# Patient Record
Sex: FEMALE | Race: WHITE | Employment: FULL TIME | URBAN - METROPOLITAN AREA
[De-identification: names, ages, dates, MRNs, and addresses within clinical notes are randomized per-mention and may not be internally consistent; named-entity substitution may affect disease eponyms.]

---

## 2018-03-20 ENCOUNTER — TELEPHONE (OUTPATIENT)
Dept: GASTROENTEROLOGY | Age: 73
End: 2018-03-20

## 2018-03-20 NOTE — TELEPHONE ENCOUNTER
Pt called the office to check when her last c-scope with Dr. Donna Summers was. I told her it was 6/27/2011. Verbal understanding expressed.

## 2018-03-27 ENCOUNTER — TELEPHONE (OUTPATIENT)
Dept: GASTROENTEROLOGY | Age: 73
End: 2018-03-27

## 2018-03-27 NOTE — TELEPHONE ENCOUNTER
Jenifer Brown from [facility] called to see when patient is/was due for colonoscopy. I reviewed 's old records and patient's last c-scope was done in 2011 and patient was advised to repeat in 3 years but she did not. I faxed the report to [phone].

## 2018-04-04 ENCOUNTER — TELEPHONE (OUTPATIENT)
Dept: CARDIOLOGY CLINIC | Age: 73
End: 2018-04-04

## 2018-09-06 ENCOUNTER — OFFICE VISIT (OUTPATIENT)
Dept: CARDIOLOGY CLINIC | Age: 73
End: 2018-09-06

## 2018-09-06 VITALS
HEIGHT: 65 IN | SYSTOLIC BLOOD PRESSURE: 136 MMHG | DIASTOLIC BLOOD PRESSURE: 82 MMHG | HEART RATE: 76 BPM | OXYGEN SATURATION: 98 % | BODY MASS INDEX: 23.46 KG/M2 | WEIGHT: 140.8 LBS

## 2018-09-06 DIAGNOSIS — Z86.79 HISTORY OF ATRIAL FIBRILLATION: ICD-10-CM

## 2018-09-06 DIAGNOSIS — E78.2 MIXED HYPERLIPIDEMIA: ICD-10-CM

## 2018-09-06 DIAGNOSIS — I48.0 PAROXYSMAL ATRIAL FIBRILLATION (HCC): Primary | ICD-10-CM

## 2018-09-06 PROCEDURE — 99213 OFFICE O/P EST LOW 20 MIN: CPT | Performed by: NURSE PRACTITIONER

## 2018-09-06 NOTE — PROGRESS NOTES
CARDIOLOGY  NOTE      9/6/2018    RE: Lissett Lewis  ([birth year])                               TO:  Dr. Estefani Zapata MD  The primary cardiologist is Dr. Freya Escamilla    CC: paroxysmal atrial fib    Thank you for involving me in taking care of your  patient Lissett Lewis, who is a  68y.o. year old female with past medical  history of atrial fib and hyperlipidemia. She is seen today for a follow up on her atrial fib and hyperlipidemia. She during this visit she notes she was recently seen in [facility] for an irregular heart beat. She was found to be in atrial fib with an RVR and to have pneumonia. She currently denies any further palpitations or dizziness. She is able to tell when her rhythm is irregular and in atrial fib. There is no SOB or chest pain. She does drink 1 cup of coffee per day. There is no report of soda intake. Vitals:    09/06/18 1607   BP: 136/82   Pulse: 76   SpO2: 98%       Current Outpatient Prescriptions   Medication Sig Dispense Refill    azithromycin (ZITHROMAX) 250 MG tablet Take 1 tablet by mouth daily for 4 days 4 tablet 0    Calcium Carbonate-Vitamin D (CALCIUM 500/D PO) Take 500 mg by mouth 2 times daily      aspirin 325 MG tablet Take 325 mg by mouth daily      propafenone (RYTHMOL SR) 225 MG extended release capsule Take 1 capsule by mouth 2 times daily 180 capsule 3    zolpidem (AMBIEN) 5 MG tablet Take 5 mg by mouth nightly as needed for Sleep      atorvastatin (LIPITOR) 40 MG tablet Take 40 mg by mouth daily.  montelukast (SINGULAIR) 10 MG tablet Take 10 mg by mouth nightly. No current facility-administered medications for this visit. Allergies: Patient has no known allergies.   Past Medical History:   Diagnosis Date    Arrhythmia     AFIB    [facility]     Cancer ([facility]     padgets breast left    Constipation     H/O 24 hour EKG monitoring 12/27/2005 12/27/2005 supraventricular heartburn  · Genitourinary: No dysuria, trouble voiding, or hematuria  · Musculoskeletal:  None  · Integumentary: No rash or pruritis  · Neurological: No TIA or stroke symptoms  · Psychiatric: No anxiety or depression  · Endocrine: No malaise, fatigue or temperature intolerance  · Hematologic/Lymphatic: No bleeding problems, blood clots or swollen lymph nodes  · Allergic/Immunologic: No nasal congestion or hives    Objective:      Physical Exam:  /82 (Site: Left Arm, Position: Sitting)   Pulse 76   Ht 5' 5\" (1.651 m)   Wt 140 lb 12.8 oz (63.9 kg)   SpO2 98%   BMI 23.43 kg/m²   Wt Readings from Last 3 Encounters:   09/06/18 140 lb 12.8 oz (63.9 kg)   09/04/18 140 lb (63.5 kg)   07/25/18 140 lb 12.8 oz (63.9 kg)     Body mass index is 23.43 kg/m². GENERAL - Alert, oriented, pleasant, in no apparent distress. Head unremarkable  Eyes - Not injected conjunctiva  ENT  normal mucosa  Neck - Supple. No jugular venous distention noted. No carotid bruits. Cardiovascular  Normal S1 and S2 No murmur appreciated, No gallop. Extremities - No cyanosis, clubbing, no edema. Pulmonary  No respiratory distress. No wheezes or rales. Chest is clear in all lungs fields   Pulses: Bilateral radial and pedal pulses normal  Abdomen  no tenderness  Musculoskeletal  normal strength  Neurologic  There are no gross focal neurologic abnormalities.   Skin-  No rash  Affect- normal mood    DATA:  Lab Results   Component Value Date    CKTOTAL 60 09/04/2018     BNP:  No results found for: BNP  PT/INR:  No results found for: PTINR  No results found for: LABA1C  Lab Results   Component Value Date    CHOL 166 11/05/2015    TRIG 237 11/05/2015    HDL 42 11/05/2015    LDLCALC 77 11/05/2015     Lab Results   Component Value Date    ALT 21 09/04/2018    AST 30 09/04/2018     TSH:    Lab Results   Component Value Date    TSH 2.7 11/05/2015         Assessment/ Plan:     Patient seen,  interviewed and examined     Paroxysmal Atrial fib     Rate controlled yes. She is not on anticoagulation. taking full dose ASA   Taking Rythmol   Recent episode may be related to pneumonia - now on [address] vas 2   She want to think about further anticoagulation for now     Hyperlipidemia   no recent labs available    Patient is on statins  No change       Patient is encouraged to exercise even a brisk walk for 30 minutes at least 3 to 4 times a week. Lifestyle and risk factor modificatons discussed. Various goals are discussed and questions answered. Continue current medications. Appropriate prescriptions are addressed. Questions answered and patient verbalizes understanding.    Call for any problems, questions, or concerns.

## 2018-10-10 ENCOUNTER — HOSPITAL ENCOUNTER (OUTPATIENT)
Age: 73
Discharge: HOME | End: 2018-10-10
Payer: COMMERCIAL

## 2018-10-10 ENCOUNTER — HOSPITAL ENCOUNTER (OUTPATIENT)
Dept: GENERAL RADIOLOGY | Age: 73
Discharge: HOME | End: 2018-10-10
Payer: COMMERCIAL

## 2018-10-10 DIAGNOSIS — J18.9 UNRESOLVED PNEUMONIA: ICD-10-CM

## 2018-10-10 PROCEDURE — 71046 X-RAY EXAM CHEST 2 VIEWS: CPT

## 2018-10-25 ENCOUNTER — HOSPITAL ENCOUNTER (OUTPATIENT)
Dept: CT IMAGING | Age: 73
Discharge: HOME | End: 2018-10-25
Payer: COMMERCIAL

## 2018-10-25 DIAGNOSIS — R93.89 ABNORMAL CHEST X-RAY: ICD-10-CM

## 2018-10-25 LAB
GFR AFRICAN AMERICAN: >60 ML/MIN/1.73M2
GFR NON-AFRICAN AMERICAN: 54 ML/MIN/1.73M2
POC CREATININE: 1 MG/DL (ref 0.6–1.1)

## 2018-10-25 PROCEDURE — 71260 CT THORAX DX C+: CPT

## 2018-10-25 PROCEDURE — 6360000004 HC RX CONTRAST MEDICATION: Performed by: INTERNAL MEDICINE

## 2018-10-25 RX ADMIN — IOPAMIDOL 75 ML: 755 INJECTION, SOLUTION INTRAVENOUS at 09:19

## 2018-10-30 ENCOUNTER — HOSPITAL ENCOUNTER (OUTPATIENT)
Dept: CT IMAGING | Age: 73
Discharge: HOME | End: 2018-10-30
Payer: COMMERCIAL

## 2018-10-30 DIAGNOSIS — Z12.39 BREAST CANCER SCREENING: ICD-10-CM

## 2018-10-30 DIAGNOSIS — R93.2 NONVISUALIZATION OF GALLBLADDER: ICD-10-CM

## 2018-10-30 PROCEDURE — 74177 CT ABD & PELVIS W/CONTRAST: CPT

## 2018-10-30 PROCEDURE — 6360000004 HC RX CONTRAST MEDICATION: Performed by: INTERNAL MEDICINE

## 2018-10-30 RX ADMIN — IOPAMIDOL 75 ML: 755 INJECTION, SOLUTION INTRAVENOUS at 15:06

## 2018-11-06 ENCOUNTER — OFFICE VISIT (OUTPATIENT)
Dept: PULMONOLOGY | Age: 73
End: 2018-11-06
Payer: COMMERCIAL

## 2018-11-06 ENCOUNTER — HOSPITAL ENCOUNTER (OUTPATIENT)
Age: 73
Discharge: HOME | End: 2018-11-06
Payer: COMMERCIAL

## 2018-11-06 VITALS
DIASTOLIC BLOOD PRESSURE: 62 MMHG | HEART RATE: 67 BPM | SYSTOLIC BLOOD PRESSURE: 118 MMHG | HEIGHT: 65 IN | OXYGEN SATURATION: 98 % | BODY MASS INDEX: 24.59 KG/M2 | WEIGHT: 147.6 LBS

## 2018-11-06 DIAGNOSIS — K76.89 BENIGN LIVER CYST: ICD-10-CM

## 2018-11-06 DIAGNOSIS — R59.0 MEDIASTINAL LYMPHADENOPATHY: ICD-10-CM

## 2018-11-06 DIAGNOSIS — C50.012: ICD-10-CM

## 2018-11-06 DIAGNOSIS — D72.10 EOSINOPHILIA: ICD-10-CM

## 2018-11-06 DIAGNOSIS — R59.0 HILAR ADENOPATHY: ICD-10-CM

## 2018-11-06 LAB
APTT: 27.1 SECONDS (ref 21.2–33)
BASOPHILS ABSOLUTE: 0 K/CU MM
BASOPHILS RELATIVE PERCENT: 0.6 % (ref 0–1)
DIFFERENTIAL TYPE: ABNORMAL
EOSINOPHILS ABSOLUTE: 0.2 K/CU MM
EOSINOPHILS RELATIVE PERCENT: 3.2 % (ref 0–3)
HCT VFR BLD CALC: 44.2 % (ref 37–47)
HEMOGLOBIN: 14.2 GM/DL (ref 12.5–16)
IMMATURE NEUTROPHIL %: 0.4 % (ref 0–0.43)
INR BLD: 0.96 INDEX
LYMPHOCYTES ABSOLUTE: 1.1 K/CU MM
LYMPHOCYTES RELATIVE PERCENT: 22.4 % (ref 24–44)
MCH RBC QN AUTO: 29.8 PG (ref 27–31)
MCHC RBC AUTO-ENTMCNC: 32.1 % (ref 32–36)
MCV RBC AUTO: 92.9 FL (ref 78–100)
MONOCYTES ABSOLUTE: 0.5 K/CU MM
MONOCYTES RELATIVE PERCENT: 9.6 % (ref 0–4)
NUCLEATED RBC %: 0 %
PDW BLD-RTO: 12.8 % (ref 11.7–14.9)
PLATELET # BLD: 241 K/CU MM (ref 140–440)
PMV BLD AUTO: 10.1 FL (ref 7.5–11.1)
PROTHROMBIN TIME: 11 SECONDS (ref 9.12–12.5)
RBC # BLD: 4.76 M/CU MM (ref 4.2–5.4)
SEGMENTED NEUTROPHILS ABSOLUTE COUNT: 3.2 K/CU MM
SEGMENTED NEUTROPHILS RELATIVE PERCENT: 63.8 % (ref 36–66)
TOTAL IMMATURE NEUTOROPHIL: 0.02 K/CU MM
TOTAL NUCLEATED RBC: 0 K/CU MM
WBC # BLD: 5 K/CU MM (ref 4–10.5)

## 2018-11-06 PROCEDURE — 85730 THROMBOPLASTIN TIME PARTIAL: CPT

## 2018-11-06 PROCEDURE — 85610 PROTHROMBIN TIME: CPT

## 2018-11-06 PROCEDURE — 99204 OFFICE O/P NEW MOD 45 MIN: CPT | Performed by: INTERNAL MEDICINE

## 2018-11-06 PROCEDURE — 85025 COMPLETE CBC W/AUTO DIFF WBC: CPT

## 2018-11-06 PROCEDURE — 36415 COLL VENOUS BLD VENIPUNCTURE: CPT

## 2018-11-06 ASSESSMENT — ENCOUNTER SYMPTOMS
COUGH: 0
ABDOMINAL PAIN: 0
EYE ITCHING: 0
EYE DISCHARGE: 0
ABDOMINAL DISTENTION: 0
SHORTNESS OF BREATH: 0
BACK PAIN: 1
CHEST TIGHTNESS: 0

## 2018-11-06 ASSESSMENT — SLEEP AND FATIGUE QUESTIONNAIRES
HOW LIKELY ARE YOU TO NOD OFF OR FALL ASLEEP WHILE SITTING AND TALKING TO SOMEONE: 0
HOW LIKELY ARE YOU TO NOD OFF OR FALL ASLEEP WHILE SITTING QUIETLY AFTER LUNCH WITHOUT ALCOHOL: 0
HOW LIKELY ARE YOU TO NOD OFF OR FALL ASLEEP WHILE LYING DOWN TO REST IN THE AFTERNOON WHEN CIRCUMSTANCES PERMIT: 0
HOW LIKELY ARE YOU TO NOD OFF OR FALL ASLEEP IN A CAR, WHILE STOPPED FOR A FEW MINUTES IN TRAFFIC: 0
HOW LIKELY ARE YOU TO NOD OFF OR FALL ASLEEP WHILE SITTING INACTIVE IN A PUBLIC PLACE: 0
HOW LIKELY ARE YOU TO NOD OFF OR FALL ASLEEP WHEN YOU ARE A PASSENGER IN A CAR FOR AN HOUR WITHOUT A BREAK: 0
HOW LIKELY ARE YOU TO NOD OFF OR FALL ASLEEP WHILE SITTING AND READING: 0
ESS TOTAL SCORE: 1
HOW LIKELY ARE YOU TO NOD OFF OR FALL ASLEEP WHILE WATCHING TV: 1
NECK CIRCUMFERENCE (INCHES): 13

## 2018-11-06 NOTE — PROGRESS NOTES
Allergies    Past Medical History:   Diagnosis Date    Arrhythmia     AFIB    At[facility] fibrillation ([facility]     Cancer ([facility]     padgets breast left    Constipation     H/O 24 hour EKG monitoring 2005 supraventricular ectopic activity consisted of 1225 beats, five were in one run, 8 were atrial couplets, 200 were late beats, 992 were single PACS, 20 were in trigeminy, there was one dropped beat, fastest supraventricular run consisting of five beats , averaging 148 BPM    H/O echocardiogram 2015 2009    12/15 EF 60% mild MR, TR 2009 LVS is normal, EF > 55%, no pericardial effusion    H/O exercise stress test 13    History of atrial fibrillation     History of cardiovascular stress test 2009, 10/10/2007    2009 protocol Vitaliy, EF 70% global LVS function is normal, no evidence of inducible myocardial ischemia, normal pattern of perfusion in all  regions    History of Holter monitoring 12/16/15    predominant rhythm sinus    Hx of cardiovascular stress test 12/10/15    Vitaliy, EF 70%, No ischemia, normal study.     Hyperlipidemia     Postoperative hematoma of breast 7/3/2016    Tachycardia     Wears glasses        Past Surgical History:   Procedure Laterality Date    BREAST SURGERY      left nipple biopsy     SECTION      HYSTERECTOMY, VAGINAL      JOINT REPLACEMENT      KNEE SURGERY      right knee    OTHER SURGICAL HISTORY Left 2016    Breast exploration and evacuation of hematoma    PRE-MALIGNANT / BENIGN SKIN LESION EXCISION      breast 2016    SHOULDER SURGERY         Social History     Social History    Marital status:      Spouse name: N/A    Number of children: 4    Years of education: N/A     Occupational History          Social History Main Topics    Smoking status: Never Smoker    Smokeless tobacco: Never Used    Alcohol use Yes      Comment: Occasionally    Drug use: No    Sexual activity: Yes

## 2018-11-06 NOTE — ASSESSMENT & PLAN NOTE
She was treated with surgery and XRT about 2 years ago  She has mediastinal and hilar LN  I'll do a PET CT first

## 2018-11-15 ENCOUNTER — HOSPITAL ENCOUNTER (OUTPATIENT)
Dept: PET IMAGING | Age: 73
Discharge: HOME | End: 2018-11-15
Payer: COMMERCIAL

## 2018-11-15 DIAGNOSIS — R59.0 HILAR ADENOPATHY: ICD-10-CM

## 2018-11-15 DIAGNOSIS — R59.0 HILAR LYMPHADENOPATHY: ICD-10-CM

## 2018-11-15 DIAGNOSIS — R59.0 MEDIASTINAL LYMPHADENOPATHY: ICD-10-CM

## 2018-11-15 PROCEDURE — 78815 PET IMAGE W/CT SKULL-THIGH: CPT

## 2018-11-15 PROCEDURE — 3430000000 HC RX DIAGNOSTIC RADIOPHARMACEUTICAL: Performed by: INTERNAL MEDICINE

## 2018-11-15 PROCEDURE — A9552 F18 FDG: HCPCS | Performed by: INTERNAL MEDICINE

## 2018-11-15 RX ORDER — FLUDEOXYGLUCOSE F 18 200 MCI/ML
12.5 INJECTION, SOLUTION INTRAVENOUS
Status: COMPLETED | OUTPATIENT
Start: 2018-11-15 | End: 2018-11-15

## 2018-11-15 RX ADMIN — FLUDEOXYGLUCOSE F 18 12.5 MILLICURIE: 200 INJECTION, SOLUTION INTRAVENOUS at 09:18

## 2019-02-25 ENCOUNTER — OFFICE VISIT (OUTPATIENT)
Dept: CARDIOLOGY CLINIC | Age: 74
End: 2019-02-25
Payer: COMMERCIAL

## 2019-02-25 VITALS
WEIGHT: 153.4 LBS | HEART RATE: 63 BPM | HEIGHT: 65 IN | SYSTOLIC BLOOD PRESSURE: 138 MMHG | BODY MASS INDEX: 25.56 KG/M2 | DIASTOLIC BLOOD PRESSURE: 74 MMHG

## 2019-02-25 DIAGNOSIS — R00.2 PALPITATIONS: Primary | ICD-10-CM

## 2019-02-25 DIAGNOSIS — I48.91 ATRIAL FIBRILLATION WITH RAPID VENTRICULAR RESPONSE (HCC): ICD-10-CM

## 2019-02-25 PROCEDURE — 93000 ELECTROCARDIOGRAM COMPLETE: CPT | Performed by: INTERNAL MEDICINE

## 2019-02-25 PROCEDURE — 99213 OFFICE O/P EST LOW 20 MIN: CPT | Performed by: INTERNAL MEDICINE

## 2019-02-25 RX ORDER — MONTELUKAST SODIUM 10 MG/1
10 TABLET ORAL NIGHTLY
COMMUNITY

## 2019-02-27 ENCOUNTER — PROCEDURE VISIT (OUTPATIENT)
Dept: CARDIOLOGY CLINIC | Age: 74
End: 2019-02-27
Payer: COMMERCIAL

## 2019-02-27 VITALS
WEIGHT: 153 LBS | BODY MASS INDEX: 25.49 KG/M2 | SYSTOLIC BLOOD PRESSURE: 116 MMHG | HEIGHT: 65 IN | HEART RATE: 63 BPM | DIASTOLIC BLOOD PRESSURE: 68 MMHG

## 2019-02-27 DIAGNOSIS — I48.91 ATRIAL FIBRILLATION WITH RAPID VENTRICULAR RESPONSE (HCC): ICD-10-CM

## 2019-02-27 DIAGNOSIS — R00.2 PALPITATIONS: ICD-10-CM

## 2019-02-27 DIAGNOSIS — I49.9 IRREGULAR HEART RATE: ICD-10-CM

## 2019-02-27 DIAGNOSIS — Z86.79 HISTORY OF ATRIAL FIBRILLATION: Primary | ICD-10-CM

## 2019-02-27 PROCEDURE — 93015 CV STRESS TEST SUPVJ I&R: CPT | Performed by: INTERNAL MEDICINE

## 2019-03-25 ENCOUNTER — TELEPHONE (OUTPATIENT)
Dept: CARDIOLOGY CLINIC | Age: 74
End: 2019-03-25

## 2019-04-10 ENCOUNTER — NURSE ONLY (OUTPATIENT)
Dept: CARDIOLOGY CLINIC | Age: 74
End: 2019-04-10

## 2019-04-10 DIAGNOSIS — I48.91 ATRIAL FIBRILLATION, UNSPECIFIED TYPE (HCC): Primary | ICD-10-CM

## 2019-04-10 NOTE — PROGRESS NOTES
30 days e-cardio monitor placed.  # [id number]. Instructed patient on how to record the event and to call monitoring center at [phone] if any problems arise. Instructed patient to disconnect the lead wires from the electrodes before bathing or showering and reattach them afterwards. Instructed patient that the electrodes should be changed every 3 days or if they no longer adhere to the skin. Patient to mail package after the monitor has ended. Patient verbalized understanding.

## 2019-04-16 ENCOUNTER — TELEPHONE (OUTPATIENT)
Dept: CARDIOLOGY CLINIC | Age: 74
End: 2019-04-16

## 2019-04-17 DIAGNOSIS — I48.91 ATRIAL FIBRILLATION, UNSPECIFIED TYPE (HCC): Primary | ICD-10-CM

## 2019-05-03 ENCOUNTER — INITIAL CONSULT (OUTPATIENT)
Dept: CARDIOLOGY CLINIC | Age: 74
End: 2019-05-03
Payer: COMMERCIAL

## 2019-05-03 VITALS
HEART RATE: 73 BPM | OXYGEN SATURATION: 96 % | HEIGHT: 65 IN | DIASTOLIC BLOOD PRESSURE: 70 MMHG | RESPIRATION RATE: 20 BRPM | TEMPERATURE: 97.4 F | BODY MASS INDEX: 25.16 KG/M2 | SYSTOLIC BLOOD PRESSURE: 138 MMHG | WEIGHT: 151 LBS

## 2019-05-03 DIAGNOSIS — I49.9 IRREGULAR HEART RHYTHM: ICD-10-CM

## 2019-05-03 DIAGNOSIS — I49.5 TACHYCARDIA-BRADYCARDIA (HCC): ICD-10-CM

## 2019-05-03 DIAGNOSIS — I49.5 SICK SINUS SYNDROME (HCC): Primary | ICD-10-CM

## 2019-05-03 PROCEDURE — 99204 OFFICE O/P NEW MOD 45 MIN: CPT | Performed by: INTERNAL MEDICINE

## 2019-05-03 PROCEDURE — 93000 ELECTROCARDIOGRAM COMPLETE: CPT | Performed by: INTERNAL MEDICINE

## 2019-05-03 NOTE — PROGRESS NOTES
Electrophysiology Consult Note      Reason for consultation:  Palpitations , need for pacemaker    Chief complaint : Palpitations and dizziness    Referring physician: Moose Sanchez      Primary care physician: Samanta Aj MD      History of Present Illness:     Chief Complaint   Patient presents with         Patient of Dr. Aren Garza, here today for follow up for c/o palpitations and for results on a three week event monitor she wore, results not yet available as patient just removed 5/1/2019, serious event noted on day 4 results in media.  Results     3 weeek event monitor results. patient with Hx Atrial fib since 2000. She reports dizziness off and on with out warning but did not pass out.  Palpitations     patient states palpitations much better after stopping Zrytec. - still has palpitations every day for short periods. Denies SOB, dizziness, and edema.      Patient with PAF episodes for some time  Patient had been on rhythmol and cardizem before - HR was dropping and BP was low  So she is only on rhythmol    During palpitations, she feels short of breath and tired but other wise she feels okay barring few dizzy spells off and on       Pastmedical history:   Past Medical History:   Diagnosis Date    Arrhythmia     AFIB    Atrial fibrillation ([address]     Cancer ([facility] [address]     padgets breast left    Constipation     H/O 24 hour EKG monitoring 12/27/2005 12/27/2005 supraventricular ectopic activity consisted of 1225 beats, five were in one run, 8 were atrial couplets, 200 were late beats, 992 were single PACS, 20 were in trigeminy, there was one dropped beat, fastest supraventricular run consisting of five beats , averaging 148 BPM    H/O echocardiogram 12/16/2015 08/13/2009    12/15 EF 60% mild MR, TR 08/13/2009 LVS is normal, EF > 55%, no pericardial effusion    H/O exercise stress test 5/7/13    History of atrial fibrillation     History of cardiovascular stress test 2009, 10/10/2007    2009 protocol Vitaliy, EF 70% global LVS function is normal, no evidence of inducible myocardial ischemia, normal pattern of perfusion in all  regions    History of exercise stress test 2019    treadmill, normal    History of Holter monitoring 12/16/15    predominant rhythm sinus    Hx of cardiovascular stress test 12/10/15    Vitaliy, EF 70%, No ischemia, normal study.  Hyperlipidemia     Palpitations     Postoperative hematoma of breast 7/3/2016    Tachycardia     Wears glasses        Surgical history :   Past Surgical History:   Procedure Laterality Date    BREAST SURGERY      left nipple biopsy     SECTION      HYSTERECTOMY, VAGINAL      JOINT REPLACEMENT      KNEE SURGERY      right knee    OTHER SURGICAL HISTORY Left 2016    Breast exploration and evacuation of hematoma    PRE-MALIGNANT / BENIGN SKIN LESION EXCISION      breast 2016    SHOULDER SURGERY         Family history:   Family History   Problem Relation Age of Onset    Heart Attack Father     Heart Surgery Father         s/p CABG       Social history :  reports that she has never smoked. She has never used smokeless tobacco. She reports that she drinks alcohol. She reports that she does not use drugs.     No Known Allergies    Current Outpatient Medications on File Prior to Visit   Medication Sig Dispense Refill    montelukast (SINGULAIR) 10 MG tablet Take 10 mg by mouth nightly      DILT- MG extended release capsule TAKE ONE CAPSULE BY MOUTH DAILY FOR atrial fibrillation AND high blood pressure  1    Calcium Carbonate-Vitamin D (CALCIUM 500/D PO) Take 500 mg by mouth 2 times daily      aspirin 325 MG tablet Take 325 mg by mouth daily      propafenone (RYTHMOL SR) 225 MG extended release capsule Take 1 capsule by mouth 2 times daily 180 capsule 3    zolpidem (AMBIEN) 5 MG tablet Take 5 mg by mouth nightly as needed for Sleep      atorvastatin (LIPITOR) 40 MG tablet Take 40 mg by mouth daily. No current facility-administered medications on file prior to visit. Review of Systems:   Review of Systems   Constitutional: Positive for fatigue. Negative for activity change, chills and fever. HENT: Negative for congestion, ear pain and tinnitus. Eyes: Negative for photophobia, pain and visual disturbance. Respiratory: Positive for shortness of breath. Negative for cough, chest tightness and wheezing. Cardiovascular: Positive for palpitations. Negative for chest pain and leg swelling. Gastrointestinal: Negative for abdominal pain, blood in stool, constipation, diarrhea, nausea and vomiting. Endocrine: Negative for cold intolerance and heat intolerance. Genitourinary: Negative for dysuria, flank pain and hematuria. Musculoskeletal: Negative for arthralgias, back pain, myalgias and neck stiffness. Skin: Negative for color change and rash. Allergic/Immunologic: Negative for food allergies. Neurological: Positive for dizziness. Negative for numbness and headaches. Hematological: Does not bruise/bleed easily. Psychiatric/Behavioral: Negative for agitation, behavioral problems and confusion. Physical Examination:    /70 (Site: Left Upper Arm)   Pulse 73   Temp 97.4 °F (36.3 °C) (Temporal)   Resp 20   Ht 5' 5\" (1.651 m)   Wt 151 lb (68.5 kg)   SpO2 96%   BMI 25.13 kg/m²    Wt Readings from Last 3 Encounters:   05/03/19 151 lb (68.5 kg)   02/27/19 153 lb (69.4 kg)   02/25/19 153 lb 6.4 oz (69.6 kg)     Body mass index is 25.13 kg/m². Physical Exam   Constitutional: She is oriented to person, place, and time. She appears well-developed and well-nourished. HENT:   Head: Normocephalic and atraumatic. Eyes: Pupils are equal, round, and reactive to light. Conjunctivae and EOM are normal. Right eye exhibits no discharge. Neck: Normal range of motion. No JVD present. No thyromegaly present.    Cardiovascular: Normal rate, regular rhythm and normal heart sounds. Exam reveals no friction rub. No murmur heard. Pulmonary/Chest: Effort normal and breath sounds normal. No stridor. No respiratory distress. She has no wheezes. Abdominal: Soft. Bowel sounds are normal. She exhibits no distension. There is no tenderness. Musculoskeletal: Normal range of motion. She exhibits no edema or tenderness. Neurological: She is alert and oriented to person, place, and time. She displays normal reflexes. No cranial nerve deficit. Coordination normal.   Skin: Skin is warm and dry. No rash noted. No erythema. Psychiatric: She has a normal mood and affect.  Her behavior is normal.         CBC:   Lab Results   Component Value Date    WBC 5.0 11/06/2018    HGB 14.2 11/06/2018    HCT 44.2 11/06/2018     11/06/2018     Lipids:  Lab Results   Component Value Date    CHOL 166 11/05/2015    TRIG 237 11/05/2015    HDL 42 11/05/2015    LDLCALC 77 11/05/2015     PT/INR:   Lab Results   Component Value Date    INR 0.96 11/06/2018        BMP:    Lab Results   Component Value Date     09/04/2018    K 3.8 09/04/2018     09/04/2018    CO2 26 09/04/2018    BUN 14 09/04/2018     CMP:   Lab Results   Component Value Date    AST 30 09/04/2018    PROT 7.1 09/04/2018    BILITOT 0.4 09/04/2018    ALKPHOS 156 (H) 09/04/2018     TSH:    Lab Results   Component Value Date    TSH 2.7 11/05/2015       EKGINTERPRETATION - EKG Interpretation:  Sinus rhythm , trifascicular block ) first degree AV block, Right bundle branch block, LAFB), PVC      IMPRESSION / RECOMMENDATIONS:     Tachy zeb / sick sinus  PAF  Trifascicular block  HLD    Patient with PAF episodes for some time  Patient had been on rhythmol and cardizem before - HR was dropping and BP was low  So she is only on rhythmol  She also having pauses more than 3 seconds - Tachy zeb  Mild dizziness  Recommend pacemaker first and then start metoprolol along with rhythmol    Patient only on aspirin - hesitant to take anticoagulation  Discussed ablation is not an option with out anticoagulation  Patient Louvella [place]e is 2 but age is really only risk factor - so aspirin is okay (per new recommendations - sex is not an individual risk factor if its only with age)     I think she has more episodes then what she realizes  Also patient is having PVCs too now    Discussed risk with pacemaker  Patient wants to proceed with procedure  Informed consent obtained    Thanks again for allowing me to participate in care of this patient. Please call me if you have any questions. With best regards.       Mati Rajput MD, 5/3/2019 11:24 AM

## 2019-05-03 NOTE — LETTER
[facility]     Dr. Elif Rhodes     PROCEDURE: Dual Chamber Pacemaker Implant    Date of Procedure: 19 Time: 1:00 Arrival Time: 11:00    Patient Name: Charlotte Wood   :    MRN# [id number]      HOSPITAL: [facility])  Call to Pre-[place] at [phone]  2 days before your procedure      X Please have blood work and chest-x-ray done 19 at [facility], [address] [place] [address] or [facility]    X Please do not have anything by mouth after midnight prior to or 8 hours before the procedure. X You may take your medications with a sip of water in the morning before your procedure or take them with you unless listed below. Patient Signature: _________________________________  Staff: Arielle Ramos       Staff Given Instructions:___________________________                                          [facility]     Dr. Gupta Abler: Dual Chamber Pacemaker Implant    Date of Procedure: 19 Time: [address] Time: 1100    Patient Name: Charlotte Wood   :    MRN# [id number]      ? YOU HAVE MY PERMISSION TO TALK TO THE PATIENT-PLEASE  ? IV peripheral saline lock (preferred left arm. if right sided implant, right arm). ? Second IV site Right arm (saline lock)  ? Type & Screen STAT on arrival.  ? If taking Coumadin, PT INR  STAT on arrival day of procedure. ? Female patients <=48years of age >> urine HCG test.   ? Diabetic patients >> Accu check Blood sugar check. ? Document home medications in EPIC and include date and time of last dose.   ? NPO.  ? If allergy to contrast >> pre treat for contrast allergy with Benadryl 25 mg IV, Solucortef 100 mg IV and Pepcid 20 mg IV 30 mins pre procedure. ? Notify Dr. Meño Edwards of abnormal lab results.   ? Chest Prep> Clip hair anterior chest.              Physician Signature:_______________________Date:__________Time:_________ [facility]) Informed Consent for Anesthesia/Sedation, Surgery, Invasive Procedures, and other High-risk Interventions and Medication use      *This consent is applicable for 30 days following patient signature*    Procedure(s)   IAlley authorize, Dr. Jeff Mtz    and the associate(s) or assistant(s) of his/her choice, to perform the following procedure(s): Dual Chamber Pacemaker Implant    I know that unexpected conditions may require additional or different procedures than those above. I authorize the above named practitioner(s) perform these as necessary and desirable. This is based on the practitioners professional judgment. The above named practitioner has discussed the above procedure(s) with me, including:  ? Potential benefits, including likelihood of success of the procedure(s) goals  ? Risks  ? Side effects, risk of death, and risk of infection  ? Any potential problems that might occur during recuperation or healing post-procedure  ? Reasonable alternatives  ? Risks of NOT performing the procedure(s)    I acknowledge that no warranty or guarantee has been made to the results the procedure(s). I consent to the above named practitioner(s) providing additional services to me as deemed reasonable and necessary, including but not limited to:    ? Use of medications for anesthesia or sedation. ? All anesthesia and sedation carry risks. My practitioner has discussed my anticipated anesthesia and/or sedation and the risks of using, risk of not using, benefits, side effects, and alternatives. ? Use of pathology  ? I authorize [facility]) to dispose of tissues, specimens or organs when pathology is complete. ? Use of radiology  ? A contrast agent may be required for radiology procedures. My practitioner has advised me of the risks of using, risks of not using, benefits, side effects, and alternatives. ? Observers or use of photography, video/audio recording, or televising of the procedure(s). This is for medical, scientific, or educational purposes. This includes appropriate portions of my body. My identity will not be revealed. ? I consent to release of my social security number and other identifying information to [address] (FDA), and the supplier/, if I receive tissue, a device, or implant. This is to track the tissue, device, or implant for defect, recall, infection, etc.     ? Use of blood and/or blood products, if needed, through my hospital stay. My practitioner has advised me of the risks of using, risks of not using, benefits, side effects, and alternatives. ___ I do NOT want Blood or Blood products given. (Complete separate  refusal form)    Code Status (che one):  ___ I do NOT HAVE a DNR order. I am a Full-code.   I will receive CPR, intubation,  chest compressions, medications, and/or other life saving measures if I have a  cardiac or respiratory arrest.    ___ I have a Do Not Resuscitate (DNR)order.   (che one below)  ___  I rescind my DNR for surgery and immediate post-operative period through Phase 2 recovery. This means, for that time period, I will be a Full-code and receive CPR, intubation, chest compressions, medications, and/or other life saving measures, if I have a cardiac or respiratory arrest.    ___ I WANT to keep my DNR in effect during my procedure(s) and immediate post-operative recovery period through Phase 2 recovery. (Complete separate refusal form)     This form has been fully explained to me. I understand its contents.       Patients Signature: ___________________________Date: ________  Time: ________    If patient unable to sign, has engaged the [address] [place] [address] [employer], is a minor, or has a court-appointed Guardian:  [facility] Representative Name (Print): ____________________________________      Relationship ([place] one):    Guardian   Parent    Spouse    HCPOA   Child   Sibling  Next-of-Kin Friend    Patients Representative Signature: _______________________________________              Date: ______________  Time: __________    An  was used.  name/ID: _________________________________      [facility]) Witness________________________  Date: ________   Time: _________    Physician/Practitioner _______________________  Date: ________   Time: _________           Revision 2017      Abad Herr   PATIENT NAME:    Javi Sanchez                               :   1945     PROCEDURE: Dual Chamber Pacemaker Implant    DATE OF PROCEDURE: 19  DIAGNOSIS:   Tachy/Samm                     ? PLEASE CALL ABNORMAL RESULTS TO THE REQUESTING PHYSICIAN? ATTENTION PATIENTS:  You do not have to fast for the lab work.  You must go to the [facility], [address] [place] [address] or [facility]      Physician Signature:______________________Date:__________Time:__________                                        Abad Herr     PROCEDURE: Dual Chamber Pacemaker Implant    Patient Name: Javi Sanchez   :    MRN# [id number]    Home Phone Number: [phone]   Weight:    Wt Readings from Last 3 Encounters:   19 151 lb (68.5 kg)   19 153 lb (69.4 kg)   19 153 lb 6.4 oz (69.6 kg)        Insurance: Payor: [address] [place] [address] Street / Plan: Melani Number / Product Type: *No Product type* /     Date of Procedure: 19 Time: 1300 Arrival Time: 1100    Diagnosis:  Tachy/Samm  Allergies: No Known Allergies     o Call [facility] scheduling ([phone]) or Instant Message  o CONFIRMED WITH:__________________________PHONE      OR INSTANT MESSAGE    o PREAUTHORIZATION NUMBER:_________________ Spoke to:____________________ o From date:_________________ expiration date:____________________                    Lindsay Harper

## 2019-05-08 ASSESSMENT — ENCOUNTER SYMPTOMS
COLOR CHANGE: 0
EYE PAIN: 0
CONSTIPATION: 0
WHEEZING: 0
CHEST TIGHTNESS: 0
SHORTNESS OF BREATH: 1
VOMITING: 0
BLOOD IN STOOL: 0
PHOTOPHOBIA: 0
NAUSEA: 0
COUGH: 0
BACK PAIN: 0
ABDOMINAL PAIN: 0
DIARRHEA: 0

## 2019-05-22 ENCOUNTER — TELEPHONE (OUTPATIENT)
Dept: CARDIOLOGY CLINIC | Age: 74
End: 2019-05-22

## 2019-05-22 NOTE — TELEPHONE ENCOUNTER
Left msg for pt advising nothing more of significance was seen on her monitor aside from the event she had.

## 2019-05-22 NOTE — TELEPHONE ENCOUNTER
Spoke with patient regarding scheduled procedure of Dual Chamber Pacemaker Implant scheduled with Dr Christine Nino on 5/30/2019 with an arrival time of 1100 and a scheduled start time of 1300. Patient confirmed date and time. Also discussed: Patient is to have lab work and chest xray completed on 5/28/2019. Also patient is to call pre registration at number provided to pre register 2 days prior to procedure. Patient voiced understanding. Patient advised where to check in upon arriving to [facility] morning of procedure. Patient instructed to not have anything by mouth after midnight night prior to or at least 8 hours before procedure. Patient may take medications morning of procedure with a sip of water. Patient again voiced understanding. Patient advised to prepare to stay overnight following procedure for observation. Patient also advised that once back to pre op holding IV will be initiated, medical history and medications reviewed, as well as complete any other testing that may be ordered. Patient voiced understanding. Also discussed medical history of:    Patient denies using a CPAP. Patient denies history of diabetes. Denies history of Implant or Device. Denies history of bypass or valve repair. Has had a right knee replacement. Denies history of problems with anesthesia in the past.    Denies difficulty swallowing. Denies recent or active bleeding. Denies history of urinary problems or problems with dowell catheter in the past.    Denies open skin areas sores or rashes. Denies recent or current use of antibiotics. Denies recent illness, fever, or infection. Patient currently in [place] and will contact the office when returns to schedule post op appointments.

## 2019-05-28 ENCOUNTER — HOSPITAL ENCOUNTER (OUTPATIENT)
Age: 74
Discharge: HOME | End: 2019-05-28
Payer: COMMERCIAL

## 2019-05-28 ENCOUNTER — HOSPITAL ENCOUNTER (OUTPATIENT)
Dept: GENERAL RADIOLOGY | Age: 74
Discharge: HOME | End: 2019-05-28
Payer: COMMERCIAL

## 2019-05-28 DIAGNOSIS — Z01.818 PRE-OP TESTING: ICD-10-CM

## 2019-05-28 LAB
ANION GAP SERPL CALCULATED.3IONS-SCNC: 14 MMOL/L (ref 4–16)
APTT: 27.7 SECONDS (ref 21.2–33)
BASOPHILS ABSOLUTE: 0 K/CU MM
BASOPHILS RELATIVE PERCENT: 0.4 % (ref 0–1)
BUN BLDV-MCNC: 26 MG/DL (ref 6–23)
CALCIUM SERPL-MCNC: 10 MG/DL (ref 8.3–10.6)
CHLORIDE BLD-SCNC: 100 MMOL/L (ref 99–110)
CO2: 26 MMOL/L (ref 21–32)
CREAT SERPL-MCNC: 0.8 MG/DL (ref 0.6–1.1)
DIFFERENTIAL TYPE: ABNORMAL
EOSINOPHILS ABSOLUTE: 0.2 K/CU MM
EOSINOPHILS RELATIVE PERCENT: 2.4 % (ref 0–3)
GFR AFRICAN AMERICAN: >60 ML/MIN/1.73M2
GFR NON-AFRICAN AMERICAN: >60 ML/MIN/1.73M2
GLUCOSE BLD-MCNC: 91 MG/DL (ref 70–99)
HCT VFR BLD CALC: 44.7 % (ref 37–47)
HEMOGLOBIN: 14.6 GM/DL (ref 12.5–16)
IMMATURE NEUTROPHIL %: 0.1 % (ref 0–0.43)
INR BLD: 0.98 INDEX
LYMPHOCYTES ABSOLUTE: 1.1 K/CU MM
LYMPHOCYTES RELATIVE PERCENT: 16 % (ref 24–44)
MAGNESIUM: 2.2 MG/DL (ref 1.8–2.4)
MCH RBC QN AUTO: 29.1 PG (ref 27–31)
MCHC RBC AUTO-ENTMCNC: 32.7 % (ref 32–36)
MCV RBC AUTO: 89.2 FL (ref 78–100)
MONOCYTES ABSOLUTE: 0.6 K/CU MM
MONOCYTES RELATIVE PERCENT: 9.3 % (ref 0–4)
NUCLEATED RBC %: 0 %
PDW BLD-RTO: 13.2 % (ref 11.7–14.9)
PHOSPHORUS: 3.7 MG/DL (ref 2.5–4.9)
PLATELET # BLD: 224 K/CU MM (ref 140–440)
PMV BLD AUTO: 9.9 FL (ref 7.5–11.1)
POTASSIUM SERPL-SCNC: 4.1 MMOL/L (ref 3.5–5.1)
PROTHROMBIN TIME: 11.2 SECONDS (ref 9.12–12.5)
RBC # BLD: 5.01 M/CU MM (ref 4.2–5.4)
SEGMENTED NEUTROPHILS ABSOLUTE COUNT: 4.8 K/CU MM
SEGMENTED NEUTROPHILS RELATIVE PERCENT: 71.8 % (ref 36–66)
SODIUM BLD-SCNC: 140 MMOL/L (ref 135–145)
TOTAL IMMATURE NEUTOROPHIL: 0.01 K/CU MM
TOTAL NUCLEATED RBC: 0 K/CU MM
WBC # BLD: 6.7 K/CU MM (ref 4–10.5)

## 2019-05-28 PROCEDURE — 84100 ASSAY OF PHOSPHORUS: CPT

## 2019-05-28 PROCEDURE — 80048 BASIC METABOLIC PNL TOTAL CA: CPT

## 2019-05-28 PROCEDURE — 71046 X-RAY EXAM CHEST 2 VIEWS: CPT

## 2019-05-28 PROCEDURE — 85730 THROMBOPLASTIN TIME PARTIAL: CPT

## 2019-05-28 PROCEDURE — 85610 PROTHROMBIN TIME: CPT

## 2019-05-28 PROCEDURE — 85025 COMPLETE CBC W/AUTO DIFF WBC: CPT

## 2019-05-28 PROCEDURE — 36415 COLL VENOUS BLD VENIPUNCTURE: CPT

## 2019-05-28 PROCEDURE — 83735 ASSAY OF MAGNESIUM: CPT

## 2019-05-30 ENCOUNTER — TELEPHONE (OUTPATIENT)
Dept: CARDIOLOGY CLINIC | Age: 74
End: 2019-05-30

## 2019-05-30 ENCOUNTER — APPOINTMENT (OUTPATIENT)
Dept: GENERAL RADIOLOGY | Age: 74
End: 2019-05-30
Attending: INTERNAL MEDICINE
Payer: COMMERCIAL

## 2019-05-30 ENCOUNTER — HOSPITAL ENCOUNTER (OUTPATIENT)
Dept: CARDIAC CATH/INVASIVE PROCEDURES | Age: 74
Discharge: HOME | End: 2019-05-31
Attending: INTERNAL MEDICINE | Admitting: INTERNAL MEDICINE
Payer: COMMERCIAL

## 2019-05-30 DIAGNOSIS — Z95.0 S/P PLACEMENT OF CARDIAC PACEMAKER: Primary | ICD-10-CM

## 2019-05-30 LAB
ABO/RH: NORMAL
ANTIBODY SCREEN: NEGATIVE

## 2019-05-30 PROCEDURE — 2500000003 HC RX 250 WO HCPCS

## 2019-05-30 PROCEDURE — 33208 INSRT HEART PM ATRIAL & VENT: CPT

## 2019-05-30 PROCEDURE — 86850 RBC ANTIBODY SCREEN: CPT

## 2019-05-30 PROCEDURE — 2709999900 HC NON-CHARGEABLE SUPPLY

## 2019-05-30 PROCEDURE — 86900 BLOOD TYPING SEROLOGIC ABO: CPT

## 2019-05-30 PROCEDURE — 71045 X-RAY EXAM CHEST 1 VIEW: CPT

## 2019-05-30 PROCEDURE — C1785 PMKR, DUAL, RATE-RESP: HCPCS

## 2019-05-30 PROCEDURE — 6360000002 HC RX W HCPCS

## 2019-05-30 PROCEDURE — 6360000004 HC RX CONTRAST MEDICATION

## 2019-05-30 PROCEDURE — C1725 CATH, TRANSLUMIN NON-LASER: HCPCS

## 2019-05-30 PROCEDURE — C1898 LEAD, PMKR, OTHER THAN TRANS: HCPCS

## 2019-05-30 PROCEDURE — 33208 INSRT HEART PM ATRIAL & VENT: CPT | Performed by: INTERNAL MEDICINE

## 2019-05-30 PROCEDURE — 6370000000 HC RX 637 (ALT 250 FOR IP): Performed by: INTERNAL MEDICINE

## 2019-05-30 PROCEDURE — 2580000003 HC RX 258: Performed by: INTERNAL MEDICINE

## 2019-05-30 PROCEDURE — 86901 BLOOD TYPING SEROLOGIC RH(D): CPT

## 2019-05-30 PROCEDURE — C1894 INTRO/SHEATH, NON-LASER: HCPCS

## 2019-05-30 RX ORDER — HYDROCODONE BITARTRATE AND ACETAMINOPHEN 5; 325 MG/1; MG/1
1 TABLET ORAL EVERY 4 HOURS PRN
Status: DISCONTINUED | OUTPATIENT
Start: 2019-05-30 | End: 2019-05-31 | Stop reason: HOSPADM

## 2019-05-30 RX ORDER — ASPIRIN 325 MG
325 TABLET ORAL DAILY
Status: DISCONTINUED | OUTPATIENT
Start: 2019-05-31 | End: 2019-05-31 | Stop reason: HOSPADM

## 2019-05-30 RX ORDER — SODIUM CHLORIDE 0.9 % (FLUSH) 0.9 %
10 SYRINGE (ML) INJECTION PRN
Status: DISCONTINUED | OUTPATIENT
Start: 2019-05-30 | End: 2019-05-31 | Stop reason: HOSPADM

## 2019-05-30 RX ORDER — ACETAMINOPHEN 325 MG/1
650 TABLET ORAL EVERY 8 HOURS PRN
Status: DISCONTINUED | OUTPATIENT
Start: 2019-05-30 | End: 2019-05-31 | Stop reason: HOSPADM

## 2019-05-30 RX ORDER — ATORVASTATIN CALCIUM 40 MG/1
40 TABLET, FILM COATED ORAL NIGHTLY
Status: DISCONTINUED | OUTPATIENT
Start: 2019-05-31 | End: 2019-05-31 | Stop reason: HOSPADM

## 2019-05-30 RX ORDER — ZOLPIDEM TARTRATE 5 MG/1
5 TABLET ORAL NIGHTLY PRN
Status: DISCONTINUED | OUTPATIENT
Start: 2019-05-30 | End: 2019-05-31 | Stop reason: HOSPADM

## 2019-05-30 RX ORDER — HYDROCODONE BITARTRATE AND ACETAMINOPHEN 5; 325 MG/1; MG/1
2 TABLET ORAL EVERY 4 HOURS PRN
Status: DISCONTINUED | OUTPATIENT
Start: 2019-05-30 | End: 2019-05-31 | Stop reason: HOSPADM

## 2019-05-30 RX ORDER — CEFAZOLIN SODIUM 1 G/50ML
1 INJECTION, SOLUTION INTRAVENOUS EVERY 8 HOURS
Status: COMPLETED | OUTPATIENT
Start: 2019-05-31 | End: 2019-05-31

## 2019-05-30 RX ORDER — PROPAFENONE HYDROCHLORIDE 225 MG/1
225 CAPSULE, EXTENDED RELEASE ORAL 2 TIMES DAILY
Status: DISCONTINUED | OUTPATIENT
Start: 2019-05-30 | End: 2019-05-31 | Stop reason: HOSPADM

## 2019-05-30 RX ORDER — SODIUM CHLORIDE 0.9 % (FLUSH) 0.9 %
10 SYRINGE (ML) INJECTION EVERY 12 HOURS SCHEDULED
Status: DISCONTINUED | OUTPATIENT
Start: 2019-05-30 | End: 2019-05-31 | Stop reason: HOSPADM

## 2019-05-30 RX ORDER — MONTELUKAST SODIUM 10 MG/1
10 TABLET ORAL NIGHTLY
Status: DISCONTINUED | OUTPATIENT
Start: 2019-05-30 | End: 2019-05-31 | Stop reason: HOSPADM

## 2019-05-30 RX ORDER — DILTIAZEM HYDROCHLORIDE 240 MG/1
240 CAPSULE, COATED, EXTENDED RELEASE ORAL DAILY
Status: DISCONTINUED | OUTPATIENT
Start: 2019-05-31 | End: 2019-05-31 | Stop reason: HOSPADM

## 2019-05-30 RX ADMIN — HYDROCODONE BITARTRATE AND ACETAMINOPHEN 1 TABLET: 5; 325 TABLET ORAL at 22:26

## 2019-05-30 RX ADMIN — ZOLPIDEM TARTRATE 5 MG: 5 TABLET ORAL at 23:06

## 2019-05-30 RX ADMIN — MONTELUKAST 10 MG: 10 TABLET, FILM COATED ORAL at 22:26

## 2019-05-30 RX ADMIN — SODIUM CHLORIDE, PRESERVATIVE FREE 10 ML: 5 INJECTION INTRAVENOUS at 22:29

## 2019-05-30 RX ADMIN — PROPAFENONE HYDROCHLORIDE 225 MG: 225 CAPSULE, EXTENDED RELEASE ORAL at 22:27

## 2019-05-30 ASSESSMENT — PAIN DESCRIPTION - ORIENTATION: ORIENTATION: LEFT

## 2019-05-30 ASSESSMENT — PAIN SCALES - GENERAL
PAINLEVEL_OUTOF10: 5
PAINLEVEL_OUTOF10: 0
PAINLEVEL_OUTOF10: 5
PAINLEVEL_OUTOF10: 0
PAINLEVEL_OUTOF10: 0

## 2019-05-30 ASSESSMENT — PAIN DESCRIPTION - DESCRIPTORS: DESCRIPTORS: ACHING;DISCOMFORT

## 2019-05-30 ASSESSMENT — PAIN DESCRIPTION - PAIN TYPE: TYPE: ACUTE PAIN

## 2019-05-30 ASSESSMENT — PAIN DESCRIPTION - LOCATION: LOCATION: CHEST

## 2019-05-30 NOTE — OP NOTE
venous access into the left subclavian vein was obtained using the modified Seldinger technique. After central venous access was obtained, a sheath was placed in the vein. A right ventricular lead was advanced into position in the RV septum under fluoroscopic guidance and using a series of curved stylets. The lead was actively fixated. After confirming appropriate function, the sheath was split and removed. The lead was secured to the underlying tissue using suture material. A new sheath was advanced over a second previously placed wire into the vein. The atrial lead was advanced to the right atrial appendage and actively fixated under fluoroscopic guidance. After confirming appropriate function, the sheath was split and removed. The lead was secured to the underlying tissue using suture. The pocket was irrigated with an antibiotic solution. The leads were then connected to the new pulse generator which was then placed into the cleaned pocket. The pocket was then closed in two separate subcutaneous layers using 2-0 & 2-0 Vicryl and subcuticular layer using 4-0 Vicryl. The skin was covered with Steri-Strips and dressing. All sponge and needle counts were reported as correct at the end of the procedure. The patient tolerated the procedure well and there were no complications. Patient was transported to the holding area in stable condition. Lead and device information:         Plan:     The patient will be in observation status and have usual post-implant care, including chest x-ray, and antibiotic therapy and interrogation of the device.

## 2019-05-30 NOTE — H&P
Electrophysiology h&P Note      Reason for consultation:  Palpitations , need for pacemaker    Chief complaint : Palpitations and dizziness    Referring physician: Alexander Dahl      Primary care physician: Marco Brown MD      History of Present Illness: Today visit (05/30/19)    Patient here for Pacemaker implantation. No change in H&P noted from previous clinic visit. Previous visit:     Chief Complaint   Patient presents with         Patient of Dr. Michela Espinoza, here today for follow up for c/o palpitations and for results on a three week event monitor she wore, results not yet available as patient just removed 5/1/2019, serious event noted on day 4 results in media.  Results     3 weeek event monitor results. patient with Hx Atrial fib since 2000. She reports dizziness off and on with out warning but did not pass out.  Palpitations     patient states palpitations much better after stopping Zrytec. - still has palpitations every day for short periods. Denies SOB, dizziness, and edema.      Patient with PAF episodes for some time  Patient had been on rhythmol and cardizem before - HR was dropping and BP was low  So she is only on rhythmol    During palpitations, she feels short of breath and tired but other wise she feels okay barring few dizzy spells off and on       Pastmedical history:   Past Medical History:   Diagnosis Date    Arrhythmia     AFIB    Atrial fibrillation ([address]     Cancer ([facility] [address]     padgets breast left    Constipation     H/O 24 hour EKG monitoring 12/27/2005 12/27/2005 supraventricular ectopic activity consisted of 1225 beats, five were in one run, 8 were atrial couplets, 200 were late beats, 992 were single PACS, 20 were in trigeminy, there was one dropped beat, fastest supraventricular run consisting of five beats , averaging 148 BPM    H/O echocardiogram 12/16/2015 08/13/2009    12/15 EF 60% mild MR, TR 08/13/2009 LVS is normal, EF > 55%, no pericardial effusion    H/O exercise stress test 13    History of atrial fibrillation     History of cardiovascular stress test 2009, 10/10/2007    2009 protocol Vitaliy, EF 70% global LVS function is normal, no evidence of inducible myocardial ischemia, normal pattern of perfusion in all  regions    History of exercise stress test 2019    treadmill, normal    History of Holter monitoring 12/16/15    predominant rhythm sinus    Hx of cardiovascular stress test 12/10/15    Vitaliy, EF 70%, No ischemia, normal study.  Hyperlipidemia     Palpitations     Postoperative hematoma of breast 7/3/2016    Tachycardia     Wears glasses        Surgical history :   Past Surgical History:   Procedure Laterality Date    BREAST SURGERY      left nipple biopsy     SECTION      HYSTERECTOMY, VAGINAL      JOINT REPLACEMENT      KNEE SURGERY      right knee    OTHER SURGICAL HISTORY Left 2016    Breast exploration and evacuation of hematoma    PRE-MALIGNANT / BENIGN SKIN LESION EXCISION      breast 2016    SHOULDER SURGERY         Family history:   Family History   Problem Relation Age of Onset    Heart Attack Father     Heart Surgery Father         s/p CABG       Social history :  reports that she has never smoked. She has never used smokeless tobacco. She reports that she drinks alcohol. She reports that she does not use drugs. No Known Allergies    No current facility-administered medications on file prior to encounter.       Current Outpatient Medications on File Prior to Encounter   Medication Sig Dispense Refill    montelukast (SINGULAIR) 10 MG tablet Take 10 mg by mouth nightly      DILT- MG extended release capsule TAKE ONE CAPSULE BY MOUTH DAILY FOR atrial fibrillation AND high blood pressure  1    Calcium Carbonate-Vitamin D (CALCIUM 500/D PO) Take 500 mg by mouth 2 times daily      aspirin 325 MG tablet Take 325 mg by mouth daily      propafenone (RYTHMOL SR) 225 MG extended release capsule Take 1 capsule by mouth 2 times daily 180 capsule 3    zolpidem (AMBIEN) 5 MG tablet Take 5 mg by mouth nightly as needed for Sleep      atorvastatin (LIPITOR) 40 MG tablet Take 40 mg by mouth daily. Review of Systems:   Review of Systems   Constitutional: Positive for fatigue. Negative for activity change, chills and fever. HENT: Negative for congestion, ear pain and tinnitus. Eyes: Negative for photophobia, pain and visual disturbance. Respiratory: Positive for shortness of breath. Negative for cough, chest tightness and wheezing. Cardiovascular: Positive for palpitations. Negative for chest pain and leg swelling. Gastrointestinal: Negative for abdominal pain, blood in stool, constipation, diarrhea, nausea and vomiting. Endocrine: Negative for cold intolerance and heat intolerance. Genitourinary: Negative for dysuria, flank pain and hematuria. Musculoskeletal: Negative for arthralgias, back pain, myalgias and neck stiffness. Skin: Negative for color change and rash. Allergic/Immunologic: Negative for food allergies. Neurological: Positive for dizziness. Negative for numbness and headaches. Hematological: Does not bruise/bleed easily. Psychiatric/Behavioral: Negative for agitation, behavioral problems and confusion. Physical Examination:    BP (!) 144/65   Pulse 69   Temp 97.7 °F (36.5 °C) (Temporal)   Resp 19   Ht 5' 5\" (1.651 m)   Wt 144 lb (65.3 kg)   SpO2 98%   BMI 23.96 kg/m²    Wt Readings from Last 3 Encounters:   05/30/19 144 lb (65.3 kg)   05/03/19 151 lb (68.5 kg)   02/27/19 153 lb (69.4 kg)     Body mass index is 23.96 kg/m². Physical Exam   Constitutional: She is oriented to person, place, and time. She appears well-developed and well-nourished. HENT:   Head: Normocephalic and atraumatic. Eyes: Pupils are equal, round, and reactive to light.  Conjunctivae and EOM are normal. Right eye

## 2019-05-31 VITALS
RESPIRATION RATE: 21 BRPM | SYSTOLIC BLOOD PRESSURE: 123 MMHG | HEART RATE: 80 BPM | DIASTOLIC BLOOD PRESSURE: 69 MMHG | OXYGEN SATURATION: 95 % | TEMPERATURE: 97.8 F | HEIGHT: 65 IN | BODY MASS INDEX: 24.81 KG/M2 | WEIGHT: 148.9 LBS

## 2019-05-31 PROCEDURE — 6360000002 HC RX W HCPCS: Performed by: INTERNAL MEDICINE

## 2019-05-31 PROCEDURE — 93010 ELECTROCARDIOGRAM REPORT: CPT | Performed by: INTERNAL MEDICINE

## 2019-05-31 PROCEDURE — 6370000000 HC RX 637 (ALT 250 FOR IP): Performed by: INTERNAL MEDICINE

## 2019-05-31 PROCEDURE — 93005 ELECTROCARDIOGRAM TRACING: CPT | Performed by: INTERNAL MEDICINE

## 2019-05-31 PROCEDURE — 2580000003 HC RX 258: Performed by: INTERNAL MEDICINE

## 2019-05-31 PROCEDURE — 99217 PR OBSERVATION CARE DISCHARGE MANAGEMENT: CPT | Performed by: NURSE PRACTITIONER

## 2019-05-31 RX ORDER — HYDROCODONE BITARTRATE AND ACETAMINOPHEN 5; 325 MG/1; MG/1
1 TABLET ORAL EVERY 8 HOURS PRN
Qty: 15 TABLET | Refills: 0 | Status: SHIPPED | OUTPATIENT
Start: 2019-05-31 | End: 2019-06-05

## 2019-05-31 RX ADMIN — CEFAZOLIN SODIUM 1 G: 1 INJECTION, SOLUTION INTRAVENOUS at 10:07

## 2019-05-31 RX ADMIN — DILTIAZEM HYDROCHLORIDE 240 MG: 240 CAPSULE, COATED, EXTENDED RELEASE ORAL at 10:01

## 2019-05-31 RX ADMIN — HYDROCODONE BITARTRATE AND ACETAMINOPHEN 1 TABLET: 5; 325 TABLET ORAL at 10:01

## 2019-05-31 RX ADMIN — PROPAFENONE HYDROCHLORIDE 225 MG: 225 CAPSULE, EXTENDED RELEASE ORAL at 10:02

## 2019-05-31 RX ADMIN — CEFAZOLIN SODIUM 1 G: 1 INJECTION, SOLUTION INTRAVENOUS at 00:11

## 2019-05-31 RX ADMIN — SODIUM CHLORIDE, PRESERVATIVE FREE 10 ML: 5 INJECTION INTRAVENOUS at 10:02

## 2019-05-31 RX ADMIN — ASPIRIN 325 MG ORAL TABLET 325 MG: 325 PILL ORAL at 10:01

## 2019-05-31 ASSESSMENT — PAIN DESCRIPTION - LOCATION: LOCATION: SHOULDER

## 2019-05-31 ASSESSMENT — PAIN DESCRIPTION - FREQUENCY: FREQUENCY: CONTINUOUS

## 2019-05-31 ASSESSMENT — PAIN DESCRIPTION - ORIENTATION: ORIENTATION: LEFT;UPPER

## 2019-05-31 ASSESSMENT — PAIN DESCRIPTION - DESCRIPTORS: DESCRIPTORS: ACHING;CONSTANT

## 2019-05-31 ASSESSMENT — PAIN SCALES - GENERAL
PAINLEVEL_OUTOF10: 0
PAINLEVEL_OUTOF10: 0
PAINLEVEL_OUTOF10: 6

## 2019-05-31 ASSESSMENT — PAIN DESCRIPTION - PROGRESSION: CLINICAL_PROGRESSION: GRADUALLY WORSENING

## 2019-05-31 ASSESSMENT — PAIN DESCRIPTION - ONSET: ONSET: ON-GOING

## 2019-05-31 ASSESSMENT — PAIN - FUNCTIONAL ASSESSMENT: PAIN_FUNCTIONAL_ASSESSMENT: PREVENTS OR INTERFERES SOME ACTIVE ACTIVITIES AND ADLS

## 2019-05-31 NOTE — PLAN OF CARE
Problem:  Activity:  Goal: Ability to tolerate increased activity will improve  Description  Ability to tolerate increased activity will improve  Outcome: Ongoing     Problem: Pain:  Goal: Pain level will decrease  Description  Pain level will decrease  Outcome: Ongoing  Goal: Control of acute pain  Description  Control of acute pain  Outcome: Ongoing  Goal: Control of chronic pain  Description  Control of chronic pain  Outcome: Ongoing     Problem: Cardiac Output - Decreased:  Goal: Hemodynamic stability will improve  Description  Hemodynamic stability will improve  Outcome: Ongoing

## 2019-05-31 NOTE — PROGRESS NOTES
Skin assessment completed per this nurse and Abigail akers. Skin remains intact. No open areas noted to skin.

## 2019-05-31 NOTE — DISCHARGE SUMMARY
Lisa Fothergill  Discharge Summary    Charlotte Wood  :    MRN:  [id number]    Admit date:  2019  Discharge date:      Admitting Physician:  Linda Barton MD    Discharge Diagnoses:     1. SP Dual Chamber pacemaker  2. Tachy zeb       Patient Active Problem List   Diagnosis    Hyperlipidemia    History of atrial fibrillation    Arrhythmia    Tachycardia    Atrial fibrillation with rapid ventricular response (HCC)    Paroxysmal atrial fibrillation (HCC)    Postoperative hematoma of breast    Irregular heart rate    Benign liver cyst    Mediastinal lymphadenopathy    Hilar adenopathy    Cancer of left nipple (HCC)    Eosinophilia    Palpitations    S/P placement of cardiac pacemaker       Admission Condition:  fair    Discharged Condition:  good    Hospital Course:   Patient with hx of sick sinus/ tachy zeb episodes presented for implantation of dual chamber pacemaker. Patient tolerated the procedure well. Observed overnight. Patient device area was clean, no hematoma and no tenderness. Patient device interrogation was stable. CXR confirmed placement of device with no complications. Patient stable for discharge with out patient follow up. Current Discharge Medication List           Details   HYDROcodone-acetaminophen (NORCO) 5-325 MG per tablet Take 1 tablet by mouth every 8 hours as needed for Pain for up to 5 days.   Qty: 15 tablet, Refills: 0    Comments: Reduce doses taken as pain becomes manageable  Associated Diagnoses: S/P placement of cardiac pacemaker              Details   montelukast (SINGULAIR) 10 MG tablet Take 10 mg by mouth nightly      DILT- MG extended release capsule TAKE ONE CAPSULE BY MOUTH DAILY FOR atrial fibrillation AND high blood pressure  Refills: 1      Calcium Carbonate-Vitamin D (CALCIUM 500/D PO) Take 500 mg by mouth 2 times daily      aspirin 325 MG tablet Take 325 mg by mouth daily      propafenone (RYTHMOL SR) 225 MG extended release capsule Take 1 capsule by mouth 2 times daily  Qty: 180 capsule, Refills: 3      zolpidem (AMBIEN) 5 MG tablet Take 5 mg by mouth nightly as needed for Sleep      atorvastatin (LIPITOR) 40 MG tablet Take 40 mg by mouth daily. Associated Diagnoses: Hyperlipidemia; History of atrial fibrillation; Arrhythmia             Consults:  none      Discharge Exam:  /67   Pulse 60   Temp 97.7 °F (36.5 °C) (Oral)   Resp 12   Ht 5' 5\" (1.651 m)   Wt 148 lb 14.4 oz (67.5 kg)   SpO2 95%   BMI 24.78 kg/m²   General appearance: alert, appears stated age and cooperative  Head: Normocephalic, without obvious abnormality, atraumatic  Lungs: clear to auscultation bilaterally  Heart: regular rate and rhythm, S1, S2 normal, no murmur, click, rub or gallop  Pulses: 2+ and symmetric  Skin: Skin color, texture, turgor normal. No rashes or lesions left upper chest site clean dry intact. No hematoma. Minimal bruising minimal swelling.    Neurologic: Grossly normal    Disposition:   home    Signed:  Carolyn Wu  5/31/2019, 9:19 AM

## 2019-06-03 ENCOUNTER — TELEPHONE (OUTPATIENT)
Dept: CARDIOLOGY CLINIC | Age: 74
End: 2019-06-03

## 2019-06-03 NOTE — TELEPHONE ENCOUNTER
Patient just had a pace maker placed she has some questions on what position she should be laying on

## 2019-06-04 LAB
EKG ATRIAL RATE: 66 BPM
EKG DIAGNOSIS: NORMAL
EKG P AXIS: 90 DEGREES
EKG P-R INTERVAL: 250 MS
EKG Q-T INTERVAL: 456 MS
EKG QRS DURATION: 146 MS
EKG QTC CALCULATION (BAZETT): 478 MS
EKG R AXIS: -33 DEGREES
EKG T AXIS: -2 DEGREES
EKG VENTRICULAR RATE: 66 BPM

## 2019-06-06 ENCOUNTER — TELEPHONE (OUTPATIENT)
Dept: CARDIOLOGY CLINIC | Age: 74
End: 2019-06-06

## 2019-06-06 NOTE — TELEPHONE ENCOUNTER
Per Dr Dung Khan patient okay to resume Indocin. Message left on patients voicemail advising. Also asked that patient return phone call withy any further questions or concerns.

## 2019-06-06 NOTE — TELEPHONE ENCOUNTER
Called patient to move site check appointment from Friday 6/7/2019 to Monday 6/10/2019 due to Friday only being 8 days post op. Appointment moved. Patient would like to know if it would be okay for her to take NSAID Indocin. She said it was prescribed for knee pain and it really helped. This will be discussed with Dr Dung Khan and patient will receive a call back once advised. Patient voiced understanding. Message forwarded to [address] [place] [address] to discuss with Dr Dung Khan.

## 2019-06-07 ENCOUNTER — PROCEDURE VISIT (OUTPATIENT)
Dept: CARDIOLOGY CLINIC | Age: 74
End: 2019-06-07
Payer: COMMERCIAL

## 2019-06-07 ENCOUNTER — OFFICE VISIT (OUTPATIENT)
Dept: CARDIOLOGY CLINIC | Age: 74
End: 2019-06-07

## 2019-06-07 VITALS
DIASTOLIC BLOOD PRESSURE: 60 MMHG | HEIGHT: 65 IN | RESPIRATION RATE: 12 BRPM | BODY MASS INDEX: 23.66 KG/M2 | SYSTOLIC BLOOD PRESSURE: 98 MMHG | HEART RATE: 68 BPM | WEIGHT: 142 LBS

## 2019-06-07 DIAGNOSIS — R42 DIZZINESS: Primary | ICD-10-CM

## 2019-06-07 DIAGNOSIS — Z95.0 S/P PLACEMENT OF CARDIAC PACEMAKER: ICD-10-CM

## 2019-06-07 PROCEDURE — 93308 TTE F-UP OR LMTD: CPT | Performed by: INTERNAL MEDICINE

## 2019-06-07 NOTE — PROGRESS NOTES
Patient reports that she is dizzy with this  Patient does not feel that good  Check for pericardial effusion  She is orthostatic and could explain the symptoms    Decrease cardizem to 120  No effusion on echo  Patient LVEF appears normal  No significant problem with pacemker

## 2019-06-10 ENCOUNTER — TELEPHONE (OUTPATIENT)
Dept: CARDIOLOGY CLINIC | Age: 74
End: 2019-06-10

## 2019-06-10 ENCOUNTER — NURSE ONLY (OUTPATIENT)
Dept: CARDIOLOGY CLINIC | Age: 74
End: 2019-06-10

## 2019-06-10 VITALS — TEMPERATURE: 97.6 F

## 2019-06-10 DIAGNOSIS — Z95.0 STATUS POST PLACEMENT OF CARDIAC PACEMAKER: Primary | ICD-10-CM

## 2019-06-10 DIAGNOSIS — I95.9 HYPOTENSION, UNSPECIFIED HYPOTENSION TYPE: Primary | ICD-10-CM

## 2019-06-10 PROCEDURE — 99024 POSTOP FOLLOW-UP VISIT: CPT | Performed by: INTERNAL MEDICINE

## 2019-06-10 NOTE — TELEPHONE ENCOUNTER
PEDRO on VM of echo results. Patient is scheduled for site check today. This is a limited echo to assess for pericardial effusion.   No evidence of pericardial effusion.   Left ventricular systolic function is normal.   Ejection fraction of 55-60%.   Sclerotic aortic valve.   Pacer wire noted passing through tricuspid valve.   Mildly elevated pulmonary artery pressure.

## 2019-06-10 NOTE — TELEPHONE ENCOUNTER
Patient was in today for site check, she said that she saw Dr. Drew Christian Friday because she was feeling fatigued. She said that he lowered her cardizem to 180 mg and she started that on Saturday. She has taken it for 3 days and still feels tired, her head feels fuzzy and her heart rate is low 60-61 and BP has been running in the low 100's. Patient said that she thought that she would feel better than this after getting a pacemaker. She wants to know what Dr. Drew Christian thinks and if he thinks that it is the pacemaker. Message to Marc Mccloud and Rut Wynne to address.

## 2019-06-10 NOTE — PROGRESS NOTES
Patient seen for site check post pacer implant. Dressing removed. No signs of inflammation or infection noted. Edges well approximated. Patient has no complaints of pain or discomfort. Single steri strip applied. Patient instructed to no lift arm higher than shoulder level.

## 2019-06-11 RX ORDER — MIDODRINE HYDROCHLORIDE 2.5 MG/1
2.5 TABLET ORAL 3 TIMES DAILY
Qty: 90 TABLET | Refills: 1 | Status: CANCELLED | OUTPATIENT
Start: 2019-06-11

## 2019-06-11 RX ORDER — MIDODRINE HYDROCHLORIDE 2.5 MG/1
2.5 TABLET ORAL 3 TIMES DAILY
Qty: 90 TABLET | Refills: 3 | Status: SHIPPED | OUTPATIENT
Start: 2019-06-11 | End: 2019-08-20

## 2019-06-11 NOTE — TELEPHONE ENCOUNTER
Spoke with patient advised that per Kulwant Jurado it's not her pacemaker. It's low blood pressure he is going to start her on Midodrine 2.5 mg TID. Patient voiced understanding.

## 2019-07-10 ENCOUNTER — OFFICE VISIT (OUTPATIENT)
Dept: CARDIOLOGY CLINIC | Age: 74
End: 2019-07-10
Payer: COMMERCIAL

## 2019-07-10 VITALS
SYSTOLIC BLOOD PRESSURE: 110 MMHG | TEMPERATURE: 97.8 F | HEIGHT: 65 IN | DIASTOLIC BLOOD PRESSURE: 60 MMHG | HEART RATE: 63 BPM | OXYGEN SATURATION: 98 % | RESPIRATION RATE: 16 BRPM | BODY MASS INDEX: 24.03 KG/M2 | WEIGHT: 144.2 LBS

## 2019-07-10 DIAGNOSIS — Z95.0 STATUS POST PLACEMENT OF CARDIAC PACEMAKER: Primary | ICD-10-CM

## 2019-07-10 PROCEDURE — 99212 OFFICE O/P EST SF 10 MIN: CPT | Performed by: INTERNAL MEDICINE

## 2019-07-10 PROCEDURE — 93280 PM DEVICE PROGR EVAL DUAL: CPT | Performed by: INTERNAL MEDICINE

## 2019-07-10 RX ORDER — INDOMETHACIN 50 MG/1
CAPSULE ORAL
Refills: 1 | COMMUNITY
Start: 2019-06-28 | End: 2019-08-20

## 2019-07-10 RX ORDER — OMEPRAZOLE 40 MG/1
CAPSULE, DELAYED RELEASE ORAL
Refills: 0 | COMMUNITY
Start: 2019-07-01 | End: 2019-08-20

## 2019-07-10 RX ORDER — DILTIAZEM HYDROCHLORIDE 120 MG/1
CAPSULE, EXTENDED RELEASE ORAL
Qty: 30 CAPSULE | OUTPATIENT
Start: 2019-07-10

## 2019-07-10 RX ORDER — DILTIAZEM HYDROCHLORIDE 120 MG/1
CAPSULE, EXTENDED RELEASE ORAL
Refills: 1 | COMMUNITY
Start: 2019-06-07 | End: 2019-07-10 | Stop reason: SDUPTHER

## 2019-07-10 RX ORDER — DILTIAZEM HYDROCHLORIDE 120 MG/1
CAPSULE, EXTENDED RELEASE ORAL
Qty: 90 CAPSULE | Refills: 3 | Status: SHIPPED | OUTPATIENT
Start: 2019-07-10 | End: 2020-01-29

## 2019-07-10 ASSESSMENT — ENCOUNTER SYMPTOMS
NAUSEA: 0
SHORTNESS OF BREATH: 0
PHOTOPHOBIA: 0
COLOR CHANGE: 0
WHEEZING: 0
EYE PAIN: 0
BACK PAIN: 0
VOMITING: 0
COUGH: 0
BLOOD IN STOOL: 0
ABDOMINAL PAIN: 0
CONSTIPATION: 0
DIARRHEA: 0
CHEST TIGHTNESS: 0

## 2019-07-10 NOTE — PROGRESS NOTES
Negative for food allergies. Neurological: Negative for dizziness, numbness and headaches. Hematological: Does not bruise/bleed easily. Psychiatric/Behavioral: Negative for agitation, behavioral problems and confusion. Physical Examination:    /60 (Site: Right Upper Arm, Position: Sitting, Cuff Size: Medium Adult)   Pulse 63   Temp 97.8 °F (36.6 °C)   Resp 16   Ht 5' 5\" (1.651 m)   Wt 144 lb 3.2 oz (65.4 kg)   SpO2 98%   Breastfeeding? No   BMI 24.00 kg/m²    Wt Readings from Last 3 Encounters:   07/10/19 144 lb 3.2 oz (65.4 kg)   06/07/19 142 lb (64.4 kg)   05/31/19 148 lb 14.4 oz (67.5 kg)     Body mass index is 24 kg/m². Physical Exam   Constitutional: She is oriented to person, place, and time. She appears well-developed and well-nourished. HENT:   Head: Normocephalic and atraumatic. Eyes: Pupils are equal, round, and reactive to light. Conjunctivae and EOM are normal. Right eye exhibits no discharge. Neck: Normal range of motion. No JVD present. No thyromegaly present. Cardiovascular: Normal rate, regular rhythm and normal heart sounds. Exam reveals no friction rub. No murmur heard. Pulmonary/Chest: Effort normal and breath sounds normal. No stridor. No respiratory distress. She has no wheezes. Abdominal: Soft. Bowel sounds are normal. She exhibits no distension. There is no tenderness. Musculoskeletal: Normal range of motion. She exhibits no edema or tenderness. Neurological: She is alert and oriented to person, place, and time. She displays normal reflexes. No cranial nerve deficit. Coordination normal.   Skin: Skin is warm and dry. No rash noted. No erythema. Psychiatric: She has a normal mood and affect.  Her behavior is normal.         CBC:   Lab Results   Component Value Date    WBC 6.7 05/28/2019    HGB 14.6 05/28/2019    HCT 44.7 05/28/2019     05/28/2019     Lipids:  Lab Results   Component Value Date    CHOL 166 11/05/2015    TRIG 237 questions. With best regards.       Jorge L Holloway MD, 7/10/2019 2:34 PM

## 2019-07-11 ENCOUNTER — TELEPHONE (OUTPATIENT)
Dept: CARDIOLOGY CLINIC | Age: 74
End: 2019-07-11

## 2019-07-11 NOTE — LETTER
Cardiology [address]  Phone: [phone]  Fax: [phone]    7/11/2019        Marcia Thompson  [address]  [address] [facility]            Dear Fady Slater: This is your [facility] schedule. You can che your calendar with these dates. Remember that your device is wireless and should automatically do these checks while you are sleeping. If for any reason I do not get your transmission then I will call you and ask that you send a manual transmission. If you have any questions or concerns, please call and ask for Misti Cole at [phone]. Thank you.

## 2019-10-15 ENCOUNTER — PROCEDURE VISIT (OUTPATIENT)
Dept: CARDIOLOGY CLINIC | Age: 74
End: 2019-10-15
Payer: COMMERCIAL

## 2019-10-15 DIAGNOSIS — I49.5 SINUS NODE DYSFUNCTION (HCC): ICD-10-CM

## 2019-10-15 DIAGNOSIS — Z95.0 CARDIAC PACEMAKER IN SITU: Primary | ICD-10-CM

## 2019-10-15 DIAGNOSIS — I44.0 AV BLOCK, 1ST DEGREE: ICD-10-CM

## 2019-10-15 PROCEDURE — 93294 REM INTERROG EVL PM/LDLS PM: CPT | Performed by: INTERNAL MEDICINE

## 2019-10-15 PROCEDURE — 93296 REM INTERROG EVL PM/IDS: CPT | Performed by: INTERNAL MEDICINE

## 2020-01-21 ENCOUNTER — PROCEDURE VISIT (OUTPATIENT)
Dept: CARDIOLOGY CLINIC | Age: 75
End: 2020-01-21
Payer: COMMERCIAL

## 2020-01-21 PROCEDURE — 93294 REM INTERROG EVL PM/LDLS PM: CPT | Performed by: INTERNAL MEDICINE

## 2020-01-21 PROCEDURE — 93296 REM INTERROG EVL PM/IDS: CPT | Performed by: INTERNAL MEDICINE

## 2020-01-29 ENCOUNTER — OFFICE VISIT (OUTPATIENT)
Dept: CARDIOLOGY CLINIC | Age: 75
End: 2020-01-29
Payer: COMMERCIAL

## 2020-01-29 VITALS
WEIGHT: 152.2 LBS | DIASTOLIC BLOOD PRESSURE: 80 MMHG | SYSTOLIC BLOOD PRESSURE: 122 MMHG | HEART RATE: 70 BPM | HEIGHT: 65 IN | BODY MASS INDEX: 25.36 KG/M2

## 2020-01-29 PROCEDURE — 99212 OFFICE O/P EST SF 10 MIN: CPT | Performed by: NURSE PRACTITIONER

## 2020-01-29 ASSESSMENT — ENCOUNTER SYMPTOMS
SINUS PAIN: 0
VOMITING: 0
CHEST TIGHTNESS: 0
COUGH: 0
CONSTIPATION: 0
SORE THROAT: 0
ABDOMINAL DISTENTION: 0
NAUSEA: 0
SHORTNESS OF BREATH: 0
EYE PAIN: 0
DIARRHEA: 0
COLOR CHANGE: 0
WHEEZING: 0
SINUS PRESSURE: 0
BACK PAIN: 0
BLOOD IN STOOL: 0
EYE ITCHING: 0
PHOTOPHOBIA: 0
ABDOMINAL PAIN: 0

## 2020-01-29 NOTE — PROGRESS NOTES
Electrophysiology FU Note      Reason for consultation:  Palpitations , need for pacemaker    Chief complaint: 6 month follow up on tachy zeb and PAF    Referring physician: Manohar Celaya      Primary care physician: Roz Rodríguez MD      History of Present Illness: This visit 1/29/2020  Patient is here today for follow up on tachy zeb and PAF. Patient reports that she is feeling well. She denies chest pain,  palpitations, shortness of breath, edema, dizziness, or syncope. Previous visit (7/10/2019)      Chief Complaint   Patient presents with    1 Month Follow-Up     Mikala/Mervata-1 mo f/u-cardizem decreased PPM 3/30. Patient reports she was feeling good for last 2 weeks and today she feels some haziness. No dizziness  Patient BP has been normal range so she stopped taking midodrine. No chest pain or shortness of breath    Dizziness           Previous visit:    Chief Complaint   Patient presents with         Patient of Dr. Theron Phipps, here today for follow up for c/o palpitations and for results on a three week event monitor she wore, results not yet available as patient just removed 5/1/2019, serious event noted on day 4 results in media.  Results     3 [employer] event monitor results. patient with Hx Atrial fib since 2000. She reports dizziness off and on with out warning but did not pass out.  Palpitations     patient states palpitations much better after stopping Zrytec. - still has palpitations every day for short periods. Denies SOB, dizziness, and edema.      Patient with PAF episodes for some time  Patient had been on rhythmol and cardizem before - HR was dropping and BP was low  So she is only on rhythmol    During palpitations, she feels short of breath and tired but other wise she feels okay barring few dizzy spells off and on       Pastmedical history:   Past Medical History:   Diagnosis Date    Arrhythmia     AFIB    Atrial fibrillation ([facility] [address]  Cancer ([address]     padgets breast left    Constipation     H/O 24 hour EKG monitoring 2005 supraventricular ectopic activity consisted of 1225 beats, five were in one run, 8 were atrial couplets, 200 were late beats, 992 were single PACS, 20 were in trigeminy, there was one dropped beat, fastest supraventricular run consisting of five beats , averaging 148 BPM    H/O echocardiogram 2015 2009    12/15 EF 60% mild MR, TR 2009 LVS is normal, EF > 55%, no pericardial effusion    H/O exercise stress test 13    History of atrial fibrillation     History of cardiovascular stress test 2009, 10/10/2007    2009 protocol Vitaliy, EF 70% global LVS function is normal, no evidence of inducible myocardial ischemia, normal pattern of perfusion in all  regions    History of exercise stress test 2019    treadmill, normal    History of Holter monitoring 12/16/15    predominant rhythm sinus    History of LIMITED echocardiogram 2019    EF 55- 60 %, No evidence of pericardial effusion, Sclerotic aortic valve, Pacer wire noted passing through tricuspid valve,  mildly elevated pulmonary artery pressure.  Hx of cardiovascular stress test 12/10/15    Vitaliy, EF 70%, No ischemia, normal study.     Hyperlipidemia     Palpitations     Postoperative hematoma of breast 7/3/2016    Tachycardia     Wears glasses        Surgical history :   Past Surgical History:   Procedure Laterality Date    BREAST SURGERY      left nipple biopsy    CARDIAC PACEMAKER PLACEMENT Left 2019    Medtronic Dual [place]     SECTION      HYSTERECTOMY, VAGINAL      JOINT REPLACEMENT      KNEE SURGERY      right knee    OTHER SURGICAL HISTORY Left 2016    Breast exploration and evacuation of hematoma    PRE-MALIGNANT / BENIGN SKIN LESION EXCISION      breast 2016    SHOULDER SURGERY         Family history:   Family History   Problem Relation Age of Onset    Heart Mood and Affect: Mood normal.         Behavior: Behavior normal.         Thought Content: Thought content normal.         Judgment: Judgment normal.       CBC:   Lab Results   Component Value Date    WBC 6.7 05/28/2019    HGB 14.6 05/28/2019    HCT 44.7 05/28/2019     05/28/2019     Lipids:  Lab Results   Component Value Date    CHOL 166 11/05/2015    TRIG 237 11/05/2015    HDL 42 11/05/2015    LDLCALC 77 11/05/2015     PT/INR:   Lab Results   Component Value Date    INR 0.98 05/28/2019        BMP:    Lab Results   Component Value Date     05/28/2019    K 4.1 05/28/2019     05/28/2019    CO2 26 05/28/2019    BUN 26 (H) 05/28/2019     CMP:   Lab Results   Component Value Date    AST 30 09/04/2018    PROT 7.1 09/04/2018    BILITOT 0.4 09/04/2018    ALKPHOS 156 (H) 09/04/2018     TSH:    Lab Results   Component Value Date    TSH 2.7 11/05/2015       EKGINTERPRETATION - EKG Interpretation:  Sinus rhythm , trifascicular block ) first degree AV block, Right bundle branch block, LAFB), PVC      IMPRESSION / RECOMMENDATIONS:     Tachy zeb/ sick sinus s/p pacemaker  PAF  Trifascicular Block   HLD      Device Assessment:      The device is Medtronic pacemaker - Dual Chamber chamber      MRI Compatible : yes    Device interrogation was performed. Mode: AAIr --- DDDr     Sensing is normal. Impedence is normal.  Threshold is normal.     There has not been interval changes. Estimated battery life is 13.9 years     The underlying rhythm is AP, VS.  40.3 % atrial paced; <0.1 % ventricular paced. Atrial Arrhythmia: yes    Non sustained VT episodes : No    Sustained VT episodes : No    Patient activity reported 5.0 hrs/day    The patient is PARTIAL ATRIAL pacemaker dependent.         Patient reports she is feeling well  Denies cardiac complaints  Few short episodes of atrial arrhythmia noted on Nov 11  Patient reports she was traveling that day  Does drink 1 cup a coffee a day  Encouraged to stop caffeine and alcohol  Continue  mg daily- patient remains hesitant to start full AC. ASA is ok per Dr Moise Lyon previous note  Continue rhythmol  BID  Continue cardizem 120 mg daily  Patient to follow up in 6 months        Thanks again for allowing me to participate in care of this patient. Please call me if you have any questions. With best regards.       Nini Rodriguez, RAJIV - CNP, 1/29/2020 3:57 PM

## 2020-01-29 NOTE — LETTER
CLINICAL STAFF DOCUMENTATION    Kaylin Niurka  [birth year]  [id number]    Have you had any Chest Pain - No    Have you had any Shortness of Breath - No      Have you had any dizziness - No      Have you had any palpitations - No      Do you have any edema - No    Do you have a surgery or procedure scheduled in the near future - No      Ask patient if they want to sign up for MyChart if they are not already signed up    Check to see if we have an E-MAIL on file for the patient    Check medication list thoroughly!!!  BE SURE TO ASK PATIENT IF THEY NEED MEDICATION REFILLS

## 2020-04-30 ENCOUNTER — PROCEDURE VISIT (OUTPATIENT)
Dept: CARDIOLOGY CLINIC | Age: 75
End: 2020-04-30
Payer: COMMERCIAL

## 2020-04-30 PROCEDURE — 93294 REM INTERROG EVL PM/LDLS PM: CPT | Performed by: INTERNAL MEDICINE

## 2020-04-30 PROCEDURE — 93296 REM INTERROG EVL PM/IDS: CPT | Performed by: INTERNAL MEDICINE

## 2020-06-18 RX ORDER — DILTIAZEM HYDROCHLORIDE 120 MG/1
CAPSULE, EXTENDED RELEASE ORAL
Qty: 90 CAPSULE | Refills: 3 | OUTPATIENT
Start: 2020-06-18

## 2020-07-06 RX ORDER — DILTIAZEM HYDROCHLORIDE 120 MG/1
120 CAPSULE, EXTENDED RELEASE ORAL DAILY
Qty: 90 CAPSULE | Refills: 3 | Status: SHIPPED | OUTPATIENT
Start: 2020-07-06 | End: 2021-04-30 | Stop reason: SDUPTHER

## 2020-07-31 ENCOUNTER — OFFICE VISIT (OUTPATIENT)
Dept: CARDIOLOGY CLINIC | Age: 75
End: 2020-07-31
Payer: COMMERCIAL

## 2020-07-31 VITALS
HEIGHT: 65 IN | SYSTOLIC BLOOD PRESSURE: 140 MMHG | HEART RATE: 70 BPM | WEIGHT: 147.8 LBS | BODY MASS INDEX: 24.62 KG/M2 | OXYGEN SATURATION: 97 % | DIASTOLIC BLOOD PRESSURE: 80 MMHG | RESPIRATION RATE: 20 BRPM | TEMPERATURE: 97.2 F

## 2020-07-31 PROCEDURE — 99213 OFFICE O/P EST LOW 20 MIN: CPT | Performed by: INTERNAL MEDICINE

## 2020-07-31 ASSESSMENT — ENCOUNTER SYMPTOMS
VOMITING: 0
ABDOMINAL PAIN: 0
BLOOD IN STOOL: 0
WHEEZING: 0
BACK PAIN: 0
COLOR CHANGE: 0
CONSTIPATION: 0
SHORTNESS OF BREATH: 0
NAUSEA: 0
CHEST TIGHTNESS: 0
PHOTOPHOBIA: 0
COUGH: 0
EYE PAIN: 0
DIARRHEA: 0

## 2020-07-31 NOTE — PROGRESS NOTES
Electrophysiology FU Note      Reason for initial consultation:  Palpitations , need for pacemaker    Chief complaint : Palpitations and dizziness    Referring physician: Hung Eye      Primary care physician: Jayant Lee MD      History of Present Illness: This visit (7/31/2020)      Chief Complaint   Patient presents with    6 Month Follow-Up     patient states is feeling good. Did report a few episodes last month of feeling lightheaded and became hot and mildly sweaty. Denies CP, SOB. palpitations. No edema. Hx pacemaker, PAF.  Atrial Fibrillation           Previous visit: (7/10/2019)      Chief Complaint   Patient presents with    1 Month Follow-Up     Mikala/Anibal-1 mo f/u-cardizem decreased PPM 3/30. Patient reports she was feeling good for last 2 weeks and today she feels some haziness. No dizziness  Patient BP has been normal range so she stopped taking midodrine. No chest pain or shortness of breath    Dizziness           Previous visit:    Chief Complaint   Patient presents with         Patient of Dr. Aidee Sanchez, here today for follow up for c/o palpitations and for results on a three week event monitor she wore, results not yet available as patient just removed 5/1/2019, serious event noted on day 4 results in media.  Results     3 [employer] event monitor results. patient with Hx Atrial fib since 2000. She reports dizziness off and on with out warning but did not pass out.  Palpitations     patient states palpitations much better after stopping Zrytec. - still has palpitations every day for short periods. Denies SOB, dizziness, and edema.      Patient with PAF episodes for some time  Patient had been on rhythmol and cardizem before - HR was dropping and BP was low  So she is only on rhythmol    During palpitations, she feels short of breath and tired but other wise she feels okay barring few dizzy spells off and on       Pastmedical history: Past Medical History:   Diagnosis Date    Arrhythmia     AFIB    Atrial fibrillation (HCC)     Cancer ([address]     padgets breast left    Constipation     H/O 24 hour EKG monitoring 2005 supraventricular ectopic activity consisted of 1225 beats, five were in one run, 8 were atrial couplets, 200 were late beats, 992 were single PACS, 20 were in trigeminy, there was one dropped beat, fastest supraventricular run consisting of five beats , averaging 148 BPM    H/O echocardiogram 2015 2009    12/15 EF 60% mild MR, TR 2009 LVS is normal, EF > 55%, no pericardial effusion    H/O exercise stress test 13    History of atrial fibrillation     History of cardiovascular stress test 2009, 10/10/2007    2009 protocol Vitaliy, EF 70% global LVS function is normal, no evidence of inducible myocardial ischemia, normal pattern of perfusion in all  regions    History of exercise stress test 2019    treadmill, normal    History of Holter monitoring 12/16/15    predominant rhythm sinus    History of LIMITED echocardiogram 2019    EF 55- 60 %, No evidence of pericardial effusion, Sclerotic aortic valve, Pacer wire noted passing through tricuspid valve,  mildly elevated pulmonary artery pressure.  Hx of cardiovascular stress test 12/10/15    Vitaliy, EF 70%, No ischemia, normal study.     Hyperlipidemia     Palpitations     Postoperative hematoma of breast 7/3/2016    Tachycardia     Wears glasses        Surgical history :   Past Surgical History:   Procedure Laterality Date    BREAST SURGERY      left nipple biopsy    CARDIAC PACEMAKER PLACEMENT Left 2019    Medtronic Dual [place]     SECTION      HYSTERECTOMY, VAGINAL      JOINT REPLACEMENT      KNEE SURGERY      right knee    OTHER SURGICAL HISTORY Left 2016    Breast exploration and evacuation of hematoma    PRE-MALIGNANT / BENIGN SKIN LESION EXCISION      breast 2016    SHOULDER SURGERY         Family history:   Family History   Problem Relation Age of Onset    Heart Attack Father     Heart Surgery Father         s/p CABG       Social history :  reports that she has never smoked. She has never used smokeless tobacco. She reports previous alcohol use. She reports that she does not use drugs. No Known Allergies    Current Outpatient Medications on File Prior to Visit   Medication Sig Dispense Refill    dilTIAZem (DILT-XR) 120 MG extended release capsule Take 1 capsule by mouth daily TAKE 1 CAPSULE DAILY 90 capsule 3    diltiazem (CARDIZEM LA) 120 MG TB24 extended release tablet Take 1 capsule by mouth daily 90 capsule 3    montelukast (SINGULAIR) 10 MG tablet Take 10 mg by mouth nightly      Calcium Carbonate-Vitamin D (CALCIUM 500/D PO) Take 500 mg by mouth 2 times daily      aspirin 325 MG tablet Take 325 mg by mouth daily      propafenone (RYTHMOL SR) 225 MG extended release capsule Take 1 capsule by mouth 2 times daily 180 capsule 3    zolpidem (AMBIEN) 5 MG tablet Take 5 mg by mouth nightly as needed for Sleep      atorvastatin (LIPITOR) 40 MG tablet Take 40 mg by mouth daily. No current facility-administered medications on file prior to visit. Review of Systems:   Review of Systems   Constitutional: Negative for activity change, chills, fatigue and fever. HENT: Negative for congestion, ear pain and tinnitus. Eyes: Negative for photophobia, pain and visual disturbance. Respiratory: Negative for cough, chest tightness, shortness of breath and wheezing. Cardiovascular: Negative for chest pain, palpitations and leg swelling. Gastrointestinal: Negative for abdominal pain, blood in stool, constipation, diarrhea, nausea and vomiting. Endocrine: Negative for cold intolerance and heat intolerance. Genitourinary: Negative for dysuria, flank pain and hematuria. Musculoskeletal: Negative for arthralgias, back pain, myalgias and neck stiffness. Skin: Negative for color change and rash. Allergic/Immunologic: Negative for food allergies. Neurological: Negative for numbness and headaches. Hematological: Does not bruise/bleed easily. Psychiatric/Behavioral: Negative for agitation, behavioral problems and confusion. Physical Examination:    BP (!) 140/80 (Site: Left Upper Arm)   Pulse 70   Temp 97.2 °F (36.2 °C)   Resp 20   Ht 5' 5\" (1.651 m)   Wt 147 lb 12.8 oz (67 kg)   SpO2 97%   BMI 24.60 kg/m²    Wt Readings from Last 3 Encounters:   07/31/20 147 lb 12.8 oz (67 kg)   02/26/20 151 lb 9.6 oz (68.8 kg)   01/29/20 152 lb 3.2 oz (69 kg)     Body mass index is 24.6 kg/m². Physical Exam  Constitutional:       Appearance: She is well-developed. HENT:      Head: Normocephalic and atraumatic. Eyes:      General:         Right eye: No discharge. Conjunctiva/sclera: Conjunctivae normal.      Pupils: Pupils are equal, round, and reactive to light. Neck:      Musculoskeletal: Normal range of motion. Thyroid: No thyromegaly. Vascular: No JVD. Cardiovascular:      Rate and Rhythm: Normal rate and regular rhythm. Heart sounds: Normal heart sounds. No murmur. No friction rub. Pulmonary:      Effort: Pulmonary effort is normal. No respiratory distress. Breath sounds: Normal breath sounds. No stridor. No wheezing. Abdominal:      General: Bowel sounds are normal. There is no distension. Palpations: Abdomen is soft. Tenderness: There is no abdominal tenderness. Musculoskeletal: Normal range of motion. General: No tenderness. Skin:     General: Skin is warm and dry. Findings: No erythema or rash. Neurological:      Mental Status: She is alert and oriented to person, place, and time. Cranial Nerves: No cranial nerve deficit.       Coordination: Coordination normal.      Deep Tendon Reflexes: Reflexes normal.   Psychiatric:         Behavior: Behavior normal.           CBC:   Lab Results   Component Value Date    WBC 6.7 05/28/2019    HGB 14.6 05/28/2019    HCT 44.7 05/28/2019     05/28/2019     Lipids:  Lab Results   Component Value Date    CHOL 166 11/05/2015    TRIG 237 11/05/2015    HDL 42 11/05/2015    LDLCALC 77 11/05/2015     PT/INR:   Lab Results   Component Value Date    INR 0.98 05/28/2019        BMP:    Lab Results   Component Value Date     05/28/2019    K 4.1 05/28/2019     05/28/2019    CO2 26 05/28/2019    BUN 26 (H) 05/28/2019     CMP:   Lab Results   Component Value Date    AST 30 09/04/2018    PROT 7.1 09/04/2018    BILITOT 0.4 09/04/2018    ALKPHOS 156 (H) 09/04/2018     TSH:    Lab Results   Component Value Date    TSH 2.7 11/05/2015       EKGINTERPRETATION - EKG Interpretation:  Sinus rhythm , trifascicular block ) first degree AV block, Right bundle branch block, LAFB), PVC      IMPRESSION / RECOMMENDATIONS:     Atrial flutter paroxysmal  Tachy zeb / sick sinus sp pacemaker  PAF  Trifascicular block  HLD      Device Assessment:      The device is Medtronic pacemaker - Dual Chamber chamber      MRI Compatible : yes    Device interrogation was performed. Mode: AAIr --- DDDr     Sensing is normal. Impedence is normal.  Threshold is normal.     There has not been interval changes. Estimated battery life is 13.4 years     The underlying rhythm is AP, VS.  40.5 % atrial paced; 0.1 % ventricular paced. Atrial Arrhythmia : 8 - atrial flutter episode appearance - all on feb 15 between 12 to 3 AM. Nothing after. Non sustained VT episodes : No    Sustained VT episodes : No    Patient activity reported 5.9 hrs/day    The patient is PARTIAL ATRIAL pacemaker dependent. Patient reports she is very active now and she is already walked 200 miles for the year and she is doing walking challenge and she is doing much better.   He has no complaints and she does not want to be on anticoagulation despite having a discussion about stroke versus bleeding  Recommend her to continue with  active life style  Refilled cardizem    Patient only on aspirin - hesitant to take anticoagulation    Patient CHADS-Vas is 2 but age is really only risk factor - so aspirin is okay (per new recommendations - sex is not an individual risk factor if its only with age)     Patient already reports that she bleeds eay just with aspirin so she is not willing to take anticoagulation and understands risk of stroke. Thanks again for allowing me to participate in care of this patient. Please call me if you have any questions. With best regards.       Belia Perez MD, 7/31/2020 12:04 PM

## 2020-08-04 ENCOUNTER — TELEPHONE (OUTPATIENT)
Dept: CARDIOLOGY CLINIC | Age: 75
End: 2020-08-04

## 2020-08-04 ENCOUNTER — PROCEDURE VISIT (OUTPATIENT)
Dept: CARDIOLOGY CLINIC | Age: 75
End: 2020-08-04
Payer: COMMERCIAL

## 2020-08-04 PROCEDURE — 93294 REM INTERROG EVL PM/LDLS PM: CPT | Performed by: INTERNAL MEDICINE

## 2020-08-04 PROCEDURE — 93296 REM INTERROG EVL PM/IDS: CPT | Performed by: INTERNAL MEDICINE

## 2020-08-04 NOTE — LETTER
Cardiology [address]  Phone: [phone]  Fax: [phone]    8/4/2020        Shane Fuentes  [address] [facility] [address]            Dear Rogerio Barraza: This is your [facility] schedule. You can che your calendar with these dates. Remember that your device is wireless and should automatically do these checks while you are sleeping. If for any reason I do not get your transmission then I will call you and ask that you send a manual transmission. If you have any questions or concerns, please call and ask for Evaline Vasquez at [phone]. Thank you.

## 2020-09-07 ENCOUNTER — APPOINTMENT (OUTPATIENT)
Dept: GENERAL RADIOLOGY | Age: 75
End: 2020-09-07
Payer: COMMERCIAL

## 2020-09-07 ENCOUNTER — HOSPITAL ENCOUNTER (EMERGENCY)
Age: 75
Discharge: HOME | End: 2020-09-07
Attending: EMERGENCY MEDICINE
Payer: COMMERCIAL

## 2020-09-07 ENCOUNTER — APPOINTMENT (OUTPATIENT)
Dept: CT IMAGING | Age: 75
End: 2020-09-07
Payer: COMMERCIAL

## 2020-09-07 VITALS
RESPIRATION RATE: 16 BRPM | SYSTOLIC BLOOD PRESSURE: 128 MMHG | HEART RATE: 84 BPM | BODY MASS INDEX: 24.49 KG/M2 | DIASTOLIC BLOOD PRESSURE: 81 MMHG | OXYGEN SATURATION: 100 % | WEIGHT: 147 LBS | HEIGHT: 65 IN | TEMPERATURE: 98.2 F

## 2020-09-07 LAB
ALBUMIN SERPL-MCNC: 4.4 GM/DL (ref 3.4–5)
ALP BLD-CCNC: 120 IU/L (ref 40–129)
ALT SERPL-CCNC: 12 U/L (ref 10–40)
ANION GAP SERPL CALCULATED.3IONS-SCNC: 13 MMOL/L (ref 4–16)
AST SERPL-CCNC: 25 IU/L (ref 15–37)
BASOPHILS ABSOLUTE: 0 K/CU MM
BASOPHILS RELATIVE PERCENT: 0.4 % (ref 0–1)
BILIRUB SERPL-MCNC: 0.5 MG/DL (ref 0–1)
BUN BLDV-MCNC: 17 MG/DL (ref 6–23)
CALCIUM SERPL-MCNC: 9.5 MG/DL (ref 8.3–10.6)
CHLORIDE BLD-SCNC: 100 MMOL/L (ref 99–110)
CO2: 23 MMOL/L (ref 21–32)
CREAT SERPL-MCNC: 0.9 MG/DL (ref 0.6–1.1)
DIFFERENTIAL TYPE: ABNORMAL
EOSINOPHILS ABSOLUTE: 0.2 K/CU MM
EOSINOPHILS RELATIVE PERCENT: 2.1 % (ref 0–3)
GFR AFRICAN AMERICAN: >60 ML/MIN/1.73M2
GFR NON-AFRICAN AMERICAN: >60 ML/MIN/1.73M2
GLUCOSE BLD-MCNC: 128 MG/DL (ref 70–99)
HCT VFR BLD CALC: 44.5 % (ref 37–47)
HEMOGLOBIN: 14.6 GM/DL (ref 12.5–16)
IMMATURE NEUTROPHIL %: 0.1 % (ref 0–0.43)
LYMPHOCYTES ABSOLUTE: 0.8 K/CU MM
LYMPHOCYTES RELATIVE PERCENT: 11.3 % (ref 24–44)
MCH RBC QN AUTO: 29.9 PG (ref 27–31)
MCHC RBC AUTO-ENTMCNC: 32.8 % (ref 32–36)
MCV RBC AUTO: 91.2 FL (ref 78–100)
MONOCYTES ABSOLUTE: 0.5 K/CU MM
MONOCYTES RELATIVE PERCENT: 6.4 % (ref 0–4)
NUCLEATED RBC %: 0 %
PDW BLD-RTO: 12.6 % (ref 11.7–14.9)
PLATELET # BLD: 173 K/CU MM (ref 140–440)
PMV BLD AUTO: 9.9 FL (ref 7.5–11.1)
POTASSIUM SERPL-SCNC: 3.9 MMOL/L (ref 3.5–5.1)
RBC # BLD: 4.88 M/CU MM (ref 4.2–5.4)
SEGMENTED NEUTROPHILS ABSOLUTE COUNT: 5.7 K/CU MM
SEGMENTED NEUTROPHILS RELATIVE PERCENT: 79.7 % (ref 36–66)
SODIUM BLD-SCNC: 136 MMOL/L (ref 135–145)
TOTAL IMMATURE NEUTOROPHIL: 0.01 K/CU MM
TOTAL NUCLEATED RBC: 0 K/CU MM
TOTAL PROTEIN: 7.1 GM/DL (ref 6.4–8.2)
TROPONIN T: <0.01 NG/ML
WBC # BLD: 7.2 K/CU MM (ref 4–10.5)

## 2020-09-07 PROCEDURE — 93010 ELECTROCARDIOGRAM REPORT: CPT | Performed by: INTERNAL MEDICINE

## 2020-09-07 PROCEDURE — 71046 X-RAY EXAM CHEST 2 VIEWS: CPT

## 2020-09-07 PROCEDURE — 99285 EMERGENCY DEPT VISIT HI MDM: CPT

## 2020-09-07 PROCEDURE — 73501 X-RAY EXAM HIP UNI 1 VIEW: CPT

## 2020-09-07 PROCEDURE — 85025 COMPLETE CBC W/AUTO DIFF WBC: CPT

## 2020-09-07 PROCEDURE — 93005 ELECTROCARDIOGRAM TRACING: CPT | Performed by: EMERGENCY MEDICINE

## 2020-09-07 PROCEDURE — 84484 ASSAY OF TROPONIN QUANT: CPT

## 2020-09-07 PROCEDURE — 80053 COMPREHEN METABOLIC PANEL: CPT

## 2020-09-07 PROCEDURE — 70450 CT HEAD/BRAIN W/O DYE: CPT

## 2020-09-07 PROCEDURE — 72125 CT NECK SPINE W/O DYE: CPT

## 2020-09-07 ASSESSMENT — PAIN SCALES - GENERAL: PAINLEVEL_OUTOF10: 4

## 2020-09-07 ASSESSMENT — PAIN DESCRIPTION - LOCATION: LOCATION: HIP

## 2020-09-07 ASSESSMENT — PAIN DESCRIPTION - ORIENTATION: ORIENTATION: LEFT

## 2020-09-07 ASSESSMENT — PAIN DESCRIPTION - DESCRIPTORS: DESCRIPTORS: THROBBING

## 2020-09-07 ASSESSMENT — PAIN DESCRIPTION - PAIN TYPE: TYPE: ACUTE PAIN

## 2020-09-07 ASSESSMENT — PAIN DESCRIPTION - FREQUENCY: FREQUENCY: CONTINUOUS
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
## 2020-09-07 NOTE — ED NOTES
Patient given discharge instructions at this time. Verbalizes understanding and denies any further needs, questions, concerns or complaints. Patient ambulatory to front of ER in standing, up position in stable condition.       Nayeli Dangelo RN  09/07/20 [id number]

## 2020-09-11 LAB
EKG ATRIAL RATE: 67 BPM
EKG DIAGNOSIS: NORMAL
EKG P AXIS: 73 DEGREES
EKG P-R INTERVAL: 180 MS
EKG Q-T INTERVAL: 444 MS
EKG QRS DURATION: 140 MS
EKG QTC CALCULATION (BAZETT): 469 MS
EKG R AXIS: -50 DEGREES
EKG T AXIS: -14 DEGREES
EKG VENTRICULAR RATE: 67 BPM

## 2020-11-14 PROCEDURE — 93296 REM INTERROG EVL PM/IDS: CPT | Performed by: INTERNAL MEDICINE

## 2020-11-14 PROCEDURE — 93294 REM INTERROG EVL PM/LDLS PM: CPT | Performed by: INTERNAL MEDICINE

## 2020-11-16 ENCOUNTER — PROCEDURE VISIT (OUTPATIENT)
Dept: CARDIOLOGY CLINIC | Age: 75
End: 2020-11-16
Payer: COMMERCIAL

## 2021-02-03 ENCOUNTER — OFFICE VISIT (OUTPATIENT)
Dept: CARDIOLOGY CLINIC | Age: 76
End: 2021-02-03
Payer: COMMERCIAL

## 2021-02-03 VITALS
SYSTOLIC BLOOD PRESSURE: 120 MMHG | BODY MASS INDEX: 24.32 KG/M2 | WEIGHT: 146 LBS | HEART RATE: 71 BPM | DIASTOLIC BLOOD PRESSURE: 70 MMHG | HEIGHT: 65 IN

## 2021-02-03 DIAGNOSIS — I49.5 TACHYCARDIA-BRADYCARDIA SYNDROME (HCC): ICD-10-CM

## 2021-02-03 DIAGNOSIS — I48.0 PAROXYSMAL ATRIAL FIBRILLATION (HCC): ICD-10-CM

## 2021-02-03 DIAGNOSIS — I48.92 PAROXYSMAL ATRIAL FLUTTER (HCC): Primary | ICD-10-CM

## 2021-02-03 DIAGNOSIS — Z95.0 S/P PLACEMENT OF CARDIAC PACEMAKER: ICD-10-CM

## 2021-02-03 PROCEDURE — 99213 OFFICE O/P EST LOW 20 MIN: CPT | Performed by: NURSE PRACTITIONER

## 2021-02-03 PROCEDURE — 93000 ELECTROCARDIOGRAM COMPLETE: CPT | Performed by: NURSE PRACTITIONER

## 2021-02-03 NOTE — LETTER
Venessa Foley [employer]  [birth year]  [id number]    Have you had any Chest Pain that is not new? - No          Have you had any Shortness of Breath - No      Have you had any dizziness - No  ?     Have you had any palpitations that are not new? - No      Is the patient on any of the following medications -   If Yes DO EKG - Needs done every 6 months    Do you have any edema - swelling in No      Do you have a surgery or procedure scheduled in the near future -       ? Ask patient if they want to sign up for MyChart if they are not already signed up    ? Check to see if we have an E-MAIL on file for the patient    ? Check medication list thoroughly!!! AND RECONCILE OUTSIDE MEDICATIONS  If dose has changed change the entire order not just the MG  BE SURE TO ASK PATIENT IF THEY NEED MEDICATION REFILLS    ?  At check out add to every patient's \"wrap up\" the following dot phrase AFTERHOURSEDUCATION and ensure we explain this to our patients

## 2021-02-03 NOTE — PATIENT INSTRUCTIONS
Please be informed that if you contact our office outside of normal business hours the physician on call cannot help with any scheduling or rescheduling issues, procedure instruction questions or any type of medication issue. We advise you for any urgent/emergency that you go to the nearest emergency room!     PLEASE CALL OUR OFFICE DURING NORMAL BUSINESS HOURS    Monday - Friday   8 am to 5 pm    [place] [address]: [phone]    [place]:  [phone]

## 2021-02-07 PROBLEM — I49.5 TACHYCARDIA-BRADYCARDIA SYNDROME (HCC): Status: ACTIVE | Noted: 2021-02-07

## 2021-02-07 ASSESSMENT — ENCOUNTER SYMPTOMS
WHEEZING: 0
SHORTNESS OF BREATH: 0
CONSTIPATION: 0
BLOOD IN STOOL: 0
COUGH: 0
NAUSEA: 0
EYE REDNESS: 0
ABDOMINAL DISTENTION: 0
VOMITING: 0
DIARRHEA: 0
ABDOMINAL PAIN: 0
SINUS PRESSURE: 0
BACK PAIN: 0
CHEST TIGHTNESS: 0
EYE ITCHING: 0
COLOR CHANGE: 0
SINUS PAIN: 0

## 2021-02-07 NOTE — PROGRESS NOTES
Electrophysiology FU Note      Reason for initial consultation:  Palpitations , need for pacemaker    Chief complaint: follow up on atrial flutter and s/p ppm     Referring physician: Bernadette Craft      Primary care physician: Mayur Ramirez MD      History of Present Illness: This visit 2/7/2021  Patient is here for follow-up on atrial flutter status post pacemaker. Patient reports she has been feeling well. She denies palpitations. She additionally denies chest pain, shortness of breath, lightheadedness, dizziness, edema or syncope    Previous visit (7/31/2020)      Chief Complaint   Patient presents with    6 Month Follow-Up     patient states is feeling good. Did report a few episodes last month of feeling lightheaded and became hot and mildly sweaty. Denies CP, SOB. palpitations. No edema. Hx pacemaker, PAF.  Atrial Fibrillation           Previous visit: (7/10/2019)      Chief Complaint   Patient presents with    1 Month Follow-Up     Mikala/Anibal-1 mo f/u-cardizem decreased PPM 3/30. Patient reports she was feeling good for last 2 weeks and today she feels some haziness. No dizziness  Patient BP has been normal range so she stopped taking midodrine. No chest pain or shortness of breath    Dizziness           Previous visit:    Chief Complaint   Patient presents with         Patient of Dr. Jaz Nguyen, here today for follow up for c/o palpitations and for results on a three week event monitor she wore, results not yet available as patient just removed 5/1/2019, serious event noted on day 4 results in media.  Results     3 weeek event monitor results. patient with Hx Atrial fib since 2000. She reports dizziness off and on with out warning but did not pass out.  Palpitations     patient states palpitations much better after stopping Zrytec. - still has palpitations every day for short periods. Denies SOB, dizziness, and edema.      Patient with PAF episodes for some time  Patient had been on rhythmol and cardizem before - HR was dropping and BP was low  So she is only on rhythmol    During palpitations, she feels short of breath and tired but other wise she feels okay barring few dizzy spells off and on       Pastmedical history:   Past Medical History:   Diagnosis Date    Arrhythmia     AFIB    Atrial fibrillation ([address]     Cancer ([address]     padgets breast left    Constipation     H/O 24 hour EKG monitoring 12/27/2005 12/27/2005 supraventricular ectopic activity consisted of 1225 beats, five were in one run, 8 were atrial couplets, 200 were late beats, 992 were single PACS, 20 were in trigeminy, there was one dropped beat, fastest supraventricular run consisting of five beats , averaging 148 BPM    H/O echocardiogram 12/16/2015 08/13/2009    12/15 EF 60% mild MR, TR 08/13/2009 LVS is normal, EF > 55%, no pericardial effusion    H/O exercise stress test 5/7/13    History of atrial fibrillation     History of cardiovascular stress test 08/13/2009, 10/10/2007    08/13/2009 protocol Vitaliy, EF 70% global LVS function is normal, no evidence of inducible myocardial ischemia, normal pattern of perfusion in all  regions    History of exercise stress test 02/27/2019    treadmill, normal    History of Holter monitoring 12/16/15    predominant rhythm sinus    History of LIMITED echocardiogram 06/07/2019    EF 55- 60 %, No evidence of pericardial effusion, Sclerotic aortic valve, Pacer wire noted passing through tricuspid valve,  mildly elevated pulmonary artery pressure.  Hx of cardiovascular stress test 12/10/15    Vitaliy, EF 70%, No ischemia, normal study.     Hyperlipidemia     Palpitations     Postoperative hematoma of breast 7/3/2016    Tachycardia     Wears glasses        Surgical history :   Past Surgical History:   Procedure Laterality Date    BREAST SURGERY      left nipple biopsy    CARDIAC PACEMAKER PLACEMENT Left 05/30/2019    Tengaged Endocrine: Negative for cold intolerance and heat intolerance. Genitourinary: Negative for difficulty urinating, dyspareunia and hematuria. Musculoskeletal: Negative for arthralgias, back pain, myalgias and neck stiffness. Skin: Negative for color change, pallor, rash and wound. Allergic/Immunologic: Negative for food allergies. Neurological: Negative for dizziness, syncope, numbness and headaches. Hematological: Does not bruise/bleed easily. Psychiatric/Behavioral: Negative for agitation, behavioral problems and confusion. The patient is not nervous/anxious. Physical Examination:    /70   Pulse 71   Ht 5' 5\" (1.651 m)   Wt 146 lb (66.2 kg)   BMI 24.30 kg/m²    Wt Readings from Last 3 Encounters:   02/03/21 146 lb (66.2 kg)   09/09/20 145 lb (65.8 kg)   09/07/20 147 lb (66.7 kg)     Body mass index is 24.3 kg/m². Physical Exam  Constitutional:       Appearance: She is well-developed. She is not ill-appearing or diaphoretic. HENT:      Head: Normocephalic and atraumatic. Right Ear: External ear normal.      Left Ear: External ear normal.      Nose: No congestion or rhinorrhea. Mouth/Throat:      Mouth: Mucous membranes are moist.      Pharynx: Oropharynx is clear. No oropharyngeal exudate or posterior oropharyngeal erythema. Eyes:      General:         Right eye: No discharge. Left eye: No discharge. Conjunctiva/sclera: Conjunctivae normal.      Pupils: Pupils are equal, round, and reactive to light. Neck:      Musculoskeletal: Normal range of motion and neck supple. No muscular tenderness. Thyroid: No thyromegaly. Vascular: No carotid bruit or JVD. Cardiovascular:      Rate and Rhythm: Normal rate and regular rhythm. Heart sounds: Normal heart sounds. No murmur. No friction rub. Pulmonary:      Effort: Pulmonary effort is normal. No respiratory distress. Breath sounds: Normal breath sounds. No stridor. No wheezing or rhonchi. Abdominal:      General: Bowel sounds are normal. There is no distension. Palpations: Abdomen is soft. Tenderness: There is no abdominal tenderness. Musculoskeletal: Normal range of motion. Right lower leg: No edema. Left lower leg: No edema. Skin:     General: Skin is warm and dry. Capillary Refill: Capillary refill takes less than 2 seconds. Findings: No erythema or rash. Neurological:      Mental Status: She is alert and oriented to person, place, and time. Gait: Gait normal.   Psychiatric:         Mood and Affect: Mood normal.         Behavior: Behavior normal.         Thought Content: Thought content normal.         Judgment: Judgment normal.       CBC:   Lab Results   Component Value Date    WBC 7.2 09/07/2020    HGB 14.6 09/07/2020    HCT 44.5 09/07/2020     09/07/2020     Lipids:  Lab Results   Component Value Date    CHOL 166 11/05/2015    TRIG 237 11/05/2015    HDL 42 11/05/2015    LDLCALC 77 11/05/2015     PT/INR:   Lab Results   Component Value Date    INR 0.98 05/28/2019        BMP:    Lab Results   Component Value Date     09/07/2020    K 3.9 09/07/2020     09/07/2020    CO2 23 09/07/2020    BUN 17 09/07/2020     CMP:   Lab Results   Component Value Date    AST 25 09/07/2020    PROT 7.1 09/07/2020    BILITOT 0.5 09/07/2020    ALKPHOS 120 09/07/2020     TSH:    Lab Results   Component Value Date    TSH 2.7 11/05/2015      EKG Interpretation:     IMPRESSION / RECOMMENDATIONS:     Paroxysmal atrial flutter  Tachy zeb/ SSS s/p pacamker  PAF  Trifascicular block    EKG reveals atrial paced rhythm with complete capture. Device Assessment:    The device is Medtronic pacemaker - Dual Chamber chamber      MRI Compatible : no    Device interrogation was performed. Mode: AAIR --- DDDR     Sensing is normal. Impedence is normal.  Threshold is normal.     There has not been interval changes.      Estimated battery life is 12.7 years     The underlying rhythm is AP,VS.  49.2 % atrial paced; <0.1 % ventricular paced. Atrial Arrhythmia : No    Non sustained VT episodes : No    Sustained VT episodes : No    Patient activity reported 4.1 hrs/day    The patient is partial atrial pacemaker dependent. No arrhythmias noted on PPM  Continue ASA  Continue Diltiazem  mg daily and Rhythmol  mg BID    Overall patient is feeling well    Patient to follow up in 6 months            Thanks again for allowing me to participate in care of this patient. Please call me if you have any questions. With best regards.       Mart Turcios, RAJIV - CNP, 2/7/2021 1:06 PM

## 2021-02-22 ENCOUNTER — PROCEDURE VISIT (OUTPATIENT)
Dept: CARDIOLOGY CLINIC | Age: 76
End: 2021-02-22
Payer: COMMERCIAL

## 2021-02-22 DIAGNOSIS — Z95.0 CARDIAC PACEMAKER IN SITU: Primary | ICD-10-CM

## 2021-02-22 DIAGNOSIS — I44.0 AV BLOCK, 1ST DEGREE: ICD-10-CM

## 2021-02-22 DIAGNOSIS — I49.5 SINUS NODE DYSFUNCTION (HCC): ICD-10-CM

## 2021-02-22 PROCEDURE — 93294 REM INTERROG EVL PM/LDLS PM: CPT | Performed by: INTERNAL MEDICINE

## 2021-02-22 PROCEDURE — 93296 REM INTERROG EVL PM/IDS: CPT | Performed by: INTERNAL MEDICINE

## 2021-04-06 ENCOUNTER — TELEPHONE (OUTPATIENT)
Dept: CARDIOLOGY CLINIC | Age: 76
End: 2021-04-06

## 2021-04-30 RX ORDER — DILTIAZEM HYDROCHLORIDE 120 MG/1
120 CAPSULE, EXTENDED RELEASE ORAL DAILY
Qty: 90 CAPSULE | Refills: 3 | Status: SHIPPED | OUTPATIENT
Start: 2021-04-30 | End: 2021-05-17

## 2021-05-17 ENCOUNTER — OFFICE VISIT (OUTPATIENT)
Dept: CARDIOLOGY CLINIC | Age: 76
End: 2021-05-17
Payer: COMMERCIAL

## 2021-05-17 VITALS
HEIGHT: 65 IN | HEART RATE: 68 BPM | BODY MASS INDEX: 25.39 KG/M2 | SYSTOLIC BLOOD PRESSURE: 128 MMHG | WEIGHT: 152.4 LBS | DIASTOLIC BLOOD PRESSURE: 72 MMHG

## 2021-05-17 DIAGNOSIS — I49.5 TACHYCARDIA-BRADYCARDIA SYNDROME (HCC): ICD-10-CM

## 2021-05-17 DIAGNOSIS — E78.2 MIXED HYPERLIPIDEMIA: ICD-10-CM

## 2021-05-17 DIAGNOSIS — Z86.79 HISTORY OF ATRIAL FIBRILLATION: Primary | ICD-10-CM

## 2021-05-17 PROCEDURE — 99214 OFFICE O/P EST MOD 30 MIN: CPT | Performed by: NURSE PRACTITIONER

## 2021-05-17 PROCEDURE — 93000 ELECTROCARDIOGRAM COMPLETE: CPT | Performed by: NURSE PRACTITIONER

## 2021-05-17 RX ORDER — INDOMETHACIN 50 MG/1
50 CAPSULE ORAL 2 TIMES DAILY WITH MEALS
COMMUNITY
End: 2021-08-11 | Stop reason: ALTCHOICE

## 2021-05-17 RX ORDER — ZOLPIDEM TARTRATE 5 MG/1
5 TABLET ORAL NIGHTLY PRN
COMMUNITY
End: 2021-08-11 | Stop reason: ALTCHOICE

## 2021-05-17 ASSESSMENT — ENCOUNTER SYMPTOMS
SHORTNESS OF BREATH: 0
COUGH: 0

## 2021-05-17 NOTE — PROGRESS NOTES
[facility] [place] [facility] [facility],[address]  Phone: [phone]    Fax [phone]    Arminda Kayser, MD, Miller Sims MD, Jaun Montano MD, MD Livan Calvo MD Verlan Mori, MD Cleatrice Diesel, MD Alana Lack, RAJIV Vizcarra, RAJIV Rudolph, [facility], APRALFONSO    CARDIOLOGY  NOTE    2021    Chloé Ellis (:  ) is a 68 y.o. female,Established patient with Dr. Darwin Pearson, here for evaluation of the following chief complaint(s):  1 Year Follow Up (Patient here for 1 year. She denies any chest pain, shortness of breath,dizziness, palpitations, and edema. )        SUBJECTIVE/OBJECTIVE:    Patient is here to follow-up with general cardiology after having dual-chamber pacemaker placed for tachybradycardia syndrome by electrophysiology in May 2019. Patient is extremely concerned that she is on blood pressure medication due to some information that she had found on the Internet about her Cardizem. Patient has a longstanding history of atrial fibrillation with tachybradycardia syndrome. Her rate is well controlled on Rythmol and Cardizem. She is on anticoagulation and is tolerating it well. She denies any issues with affording her medication or taking them on a regular basis. Patient states that she is active but does not do any organized exercising. She denies any chest pain shortness of breath palpitations lower extremity swelling orthopnea dizziness or syncope. Review of Systems   Constitutional: Negative for fatigue and fever. Respiratory: Negative for cough and shortness of breath. Cardiovascular: Negative for chest pain, palpitations and leg swelling. Musculoskeletal: Negative for arthralgias and gait problem. Neurological: Negative for dizziness, syncope, weakness, light-headedness and headaches.        Vitals:    21 1512   BP: 128/72   Pulse: 68   Weight: 152 lb 6.4 oz (69.1 kg)   Height: 5' 5\" (1.651 m)       Wt Readings from Last 3 Encounters:   05/17/21 152 lb 6.4 oz (69.1 kg)   03/03/21 150 lb (68 kg)   02/03/21 146 lb (66.2 kg)       BP Readings from Last 3 Encounters:   05/17/21 128/72   02/03/21 120/70   09/07/20 128/81       Prior to Admission medications    Medication Sig Start Date End Date Taking? Authorizing Provider   indomethacin (INDOCIN) 50 MG capsule Take 50 mg by mouth 2 times daily (with meals)   Yes Historical Provider, MD   zolpidem (AMBIEN) 5 MG tablet Take 5 mg by mouth nightly as needed for Sleep. Yes Historical Provider, MD   diltiazem (CARDIZEM LA) 120 MG TB24 extended release tablet Take 1 capsule by mouth daily 1/29/20  Yes Na Medicus, APRN - CNP   montelukast (SINGULAIR) 10 MG tablet Take 10 mg by mouth nightly   Yes Historical Provider, MD   Calcium Carbonate-Vitamin D (CALCIUM 500/D PO) Take 500 mg by mouth 2 times daily   Yes Historical Provider, MD   aspirin 325 MG tablet Take 325 mg by mouth daily   Yes Historical Provider, MD   propafenone (RYTHMOL SR) 225 MG extended release capsule Take 1 capsule by mouth 2 times daily 10/25/16  Yes Good Ridley MD   atorvastatin (LIPITOR) 40 MG tablet Take 40 mg by mouth daily. Yes Historical Provider, MD       Physical Exam  Vitals reviewed. Constitutional:       General: She is not in acute distress. Appearance: Normal appearance. She is obese. She is not ill-appearing. HENT:      Head: Atraumatic. Neck:      Vascular: No carotid bruit. Cardiovascular:      Rate and Rhythm: Normal rate and regular rhythm. Pulses: Normal pulses. Heart sounds: Normal heart sounds. No murmur heard. Pulmonary:      Effort: Pulmonary effort is normal. No respiratory distress. Breath sounds: Normal breath sounds. Musculoskeletal:         General: No swelling or deformity. Cervical back: Neck supple. No muscular tenderness. Neurological:      Mental Status: She is alert.

## 2021-05-18 NOTE — ASSESSMENT & PLAN NOTE
 Lipid panel not able to be reviewed   Lipid panel ordered    Goal is not able to be determined   Continue atorvastatin     Discussed with patient the importance of exercise, low-cholesterol diet and medication adherence.

## 2021-05-18 NOTE — ASSESSMENT & PLAN NOTE
 Rate controlled yes.  Chadsvasc score is 3   She is  on anticoagulation.  Continue Rythmol and Cardizem   EKG today shows sinus rhythm with QTC of 437    [id number] Score for Atrial Fibrillation Stroke Risk   Risk   Factors  Component Value   C CHF No 0   H HTN No 0   A2 Age >= 76 Yes,  (77 y.o.) 2   D DM No 0   S2 Prior Stroke/TIA No 0   V Vascular Disease No 0   A Age 74-69 No,  (77 y.o.) 0   Sc Sex female 1    [id number]  Score  3   Score last updated 5/17/21 1:87 PM EDT    Click here for a link to the UpToDate guideline \"Atrial Fibrillation: Anticoagulation therapy to prevent embolization    Disclaimer: Risk Score calculation is dependent on accuracy of patient problem list and past encounter diagnosis.

## 2021-06-04 ENCOUNTER — PROCEDURE VISIT (OUTPATIENT)
Dept: CARDIOLOGY CLINIC | Age: 76
End: 2021-06-04
Payer: COMMERCIAL

## 2021-06-04 DIAGNOSIS — I49.5 SINUS NODE DYSFUNCTION (HCC): ICD-10-CM

## 2021-06-04 DIAGNOSIS — Z95.0 CARDIAC PACEMAKER IN SITU: Primary | ICD-10-CM

## 2021-06-04 DIAGNOSIS — I44.0 AV BLOCK, 1ST DEGREE: ICD-10-CM

## 2021-06-04 PROCEDURE — 93294 REM INTERROG EVL PM/LDLS PM: CPT | Performed by: INTERNAL MEDICINE

## 2021-06-04 PROCEDURE — 93296 REM INTERROG EVL PM/IDS: CPT | Performed by: INTERNAL MEDICINE

## 2021-08-10 ENCOUNTER — OFFICE VISIT (OUTPATIENT)
Dept: CARDIOLOGY CLINIC | Age: 76
End: 2021-08-10
Payer: COMMERCIAL

## 2021-08-10 VITALS
WEIGHT: 151 LBS | HEIGHT: 65 IN | BODY MASS INDEX: 25.16 KG/M2 | HEART RATE: 74 BPM | DIASTOLIC BLOOD PRESSURE: 82 MMHG | SYSTOLIC BLOOD PRESSURE: 124 MMHG

## 2021-08-10 DIAGNOSIS — Z95.0 S/P PLACEMENT OF CARDIAC PACEMAKER: ICD-10-CM

## 2021-08-10 DIAGNOSIS — I49.5 TACHYCARDIA-BRADYCARDIA SYNDROME (HCC): ICD-10-CM

## 2021-08-10 DIAGNOSIS — I48.0 PAROXYSMAL ATRIAL FIBRILLATION (HCC): Primary | ICD-10-CM

## 2021-08-10 PROCEDURE — 99214 OFFICE O/P EST MOD 30 MIN: CPT | Performed by: NURSE PRACTITIONER

## 2021-08-10 PROCEDURE — 93288 INTERROG EVL PM/LDLS PM IP: CPT | Performed by: NURSE PRACTITIONER

## 2021-08-10 RX ORDER — MULTIVITAMIN WITH IRON
250 TABLET ORAL DAILY
COMMUNITY

## 2021-08-10 RX ORDER — TRAZODONE HYDROCHLORIDE 50 MG/1
50 TABLET ORAL NIGHTLY
COMMUNITY
End: 2022-08-11

## 2021-08-10 ASSESSMENT — ENCOUNTER SYMPTOMS
EYE REDNESS: 0
CONSTIPATION: 0
ABDOMINAL DISTENTION: 0
SINUS PAIN: 0
WHEEZING: 0
SINUS PRESSURE: 0
CHEST TIGHTNESS: 0
DIARRHEA: 0
COUGH: 0
BLOOD IN STOOL: 0
SHORTNESS OF BREATH: 0
EYE ITCHING: 0
COLOR CHANGE: 0
ABDOMINAL PAIN: 0
NAUSEA: 0
VOMITING: 0
BACK PAIN: 0

## 2021-08-10 NOTE — PROGRESS NOTES
Electrophysiology FU Note      Reason for initial consultation:  Palpitations , need for pacemaker    Chief complaint: follow up on atrial flutter and s/p ppm     Referring physician: Shola Lockett      Primary care physician: Eric Mandujano MD      History of Present Illness: This visit 8/11/2021  Patient is here today for follow-up on atrial flutter. Patient reports that she did have an episode of palpitations roughly about 1 week ago. She states that she knew she had gone into atrial flutter. She reports that the symptoms came on suddenly and eventually resolved on their own. She denies alcohol or caffeine use. She denies chest pain, shortness of breath, lightheadedness, dizziness edema or syncope. She states she is taking her medications as prescribed      Previous visit 2/7/2021  Patient is here for follow-up on atrial flutter status post pacemaker. Patient reports she has been feeling well. She denies palpitations. She additionally denies chest pain, shortness of breath, lightheadedness, dizziness, edema or syncope    Previous visit (7/31/2020)      Chief Complaint   Patient presents with    6 Month Follow-Up     patient states is feeling good. Did report a few episodes last month of feeling lightheaded and became hot and mildly sweaty. Denies CP, SOB. palpitations. No edema. Hx pacemaker, PAF.  Atrial Fibrillation           Previous visit: (7/10/2019)      Chief Complaint   Patient presents with    1 Month Follow-Up     Mikala/Anibal-1 mo f/u-cardizem decreased PPM 3/30. Patient reports she was feeling good for last 2 weeks and today she feels some haziness. No dizziness  Patient BP has been normal range so she stopped taking midodrine.   No chest pain or shortness of breath    Dizziness           Previous visit:    Chief Complaint   Patient presents with         Patient of Dr. Rosa Figueroa, here today for follow up for c/o palpitations and for results on a three week event monitor she wore, results not yet available as patient just removed 5/1/2019, serious event noted on day 4 results in media.  Results     3 weeek event monitor results. patient with Hx Atrial fib since 2000. She reports dizziness off and on with out warning but did not pass out.  Palpitations     patient states palpitations much better after stopping Zrytec. - still has palpitations every day for short periods. Denies SOB, dizziness, and edema.      Patient with PAF episodes for some time  Patient had been on rhythmol and cardizem before - HR was dropping and BP was low  So she is only on rhythmol    During palpitations, she feels short of breath and tired but other wise she feels okay barring few dizzy spells off and on       Pastmedical history:   Past Medical History:   Diagnosis Date    Arrhythmia     AFIB    [address] [address]     Cancer ([facility] [address] [facility]     padgets breast left    Constipation     H/O 24 hour EKG monitoring 12/27/2005 12/27/2005 supraventricular ectopic activity consisted of 1225 beats, five were in one run, 8 were atrial couplets, 200 were late beats, 992 were single PACS, 20 were in trigeminy, there was one dropped beat, fastest supraventricular run consisting of five beats , averaging 148 BPM    H/O echocardiogram 12/16/2015 08/13/2009    12/15 EF 60% mild MR, TR 08/13/2009 LVS is normal, EF > 55%, no pericardial effusion    H/O exercise stress test 5/7/13    History of atrial fibrillation     History of cardiovascular stress test 08/13/2009, 10/10/2007    08/13/2009 protocol Vitaliy, EF 70% global LVS function is normal, no evidence of inducible myocardial ischemia, normal pattern of perfusion in all  regions    History of exercise stress test 02/27/2019    treadmill, normal    History of Holter monitoring 12/16/15    predominant rhythm sinus    History of LIMITED echocardiogram 06/07/2019    EF 55- 60 %, No evidence of pericardial effusion, Sclerotic indomethacin (INDOCIN) 50 MG capsule Take 50 mg by mouth 2 times daily (with meals)      zolpidem (AMBIEN) 5 MG tablet Take 5 mg by mouth nightly as needed for Sleep. No current facility-administered medications on file prior to visit. Review of Systems:   Review of Systems   Constitutional: Negative for activity change, chills, fatigue and fever. HENT: Negative for congestion, sinus pressure and sinus pain. Eyes: Negative for redness and itching. Respiratory: Negative for cough, chest tightness, shortness of breath and wheezing. Cardiovascular: Negative for chest pain, palpitations and leg swelling. Gastrointestinal: Negative for abdominal distention, abdominal pain, blood in stool, constipation, diarrhea, nausea and vomiting. Endocrine: Negative for cold intolerance and heat intolerance. Genitourinary: Negative for difficulty urinating, dyspareunia and hematuria. Musculoskeletal: Negative for arthralgias, back pain, myalgias and neck stiffness. Skin: Negative for color change, pallor, rash and wound. Allergic/Immunologic: Negative for food allergies. Neurological: Negative for dizziness, syncope, numbness and headaches. Hematological: Does not bruise/bleed easily. Psychiatric/Behavioral: Negative for agitation, behavioral problems and confusion. The patient is not nervous/anxious. Physical Examination:    /82   Pulse 74   Ht 5' 5\" (1.651 m)   Wt 151 lb (68.5 kg)   BMI 25.13 kg/m²    Wt Readings from Last 3 Encounters:   08/10/21 151 lb (68.5 kg)   05/17/21 152 lb 6.4 oz (69.1 kg)   03/03/21 150 lb (68 kg)     Body mass index is 25.13 kg/m². Physical Exam  Constitutional:       Appearance: She is well-developed. She is not ill-appearing or diaphoretic. HENT:      Head: Normocephalic and atraumatic. Right Ear: External ear normal.      Left Ear: External ear normal.      Nose: No congestion or rhinorrhea.       Mouth/Throat:      Mouth: Mucous membranes are moist.      Pharynx: Oropharynx is clear. No oropharyngeal exudate or posterior oropharyngeal erythema. Eyes:      General:         Right eye: No discharge. Left eye: No discharge. Conjunctiva/sclera: Conjunctivae normal.      Pupils: Pupils are equal, round, and reactive to light. Neck:      Thyroid: No thyromegaly. Vascular: No carotid bruit or JVD. Cardiovascular:      Rate and Rhythm: Normal rate and regular rhythm. Heart sounds: Normal heart sounds. No murmur heard. No friction rub. Pulmonary:      Effort: Pulmonary effort is normal. No respiratory distress. Breath sounds: Normal breath sounds. No stridor. No wheezing or rhonchi. Abdominal:      General: Bowel sounds are normal. There is no distension. Palpations: Abdomen is soft. Tenderness: There is no abdominal tenderness. Musculoskeletal:         General: Normal range of motion. Cervical back: Normal range of motion and neck supple. No muscular tenderness. Right lower leg: No edema. Left lower leg: No edema. Skin:     General: Skin is warm and dry. Neurological:      Mental Status: She is alert and oriented to person, place, and time. Psychiatric:         Mood and Affect: Mood normal.         Behavior: Behavior normal.         Thought Content:  Thought content normal.         Judgment: Judgment normal.       CBC:   Lab Results   Component Value Date    WBC 7.2 09/07/2020    HGB 14.6 09/07/2020    HCT 44.5 09/07/2020     09/07/2020     Lipids:  Lab Results   Component Value Date    CHOL 166 11/05/2015    TRIG 237 11/05/2015    HDL 42 11/05/2015    LDLCALC 77 11/05/2015     PT/INR:   Lab Results   Component Value Date    INR 0.98 05/28/2019        BMP:    Lab Results   Component Value Date     09/07/2020    K 3.9 09/07/2020     09/07/2020    CO2 23 09/07/2020    BUN 17 09/07/2020     CMP:   Lab Results   Component Value Date    AST 25 09/07/2020    PROT

## 2021-09-10 ENCOUNTER — PROCEDURE VISIT (OUTPATIENT)
Dept: CARDIOLOGY CLINIC | Age: 76
End: 2021-09-10
Payer: COMMERCIAL

## 2021-09-10 DIAGNOSIS — Z95.0 CARDIAC PACEMAKER IN SITU: Primary | ICD-10-CM

## 2021-09-10 DIAGNOSIS — I49.5 SINUS NODE DYSFUNCTION (HCC): ICD-10-CM

## 2021-09-10 DIAGNOSIS — I44.0 AV BLOCK, 1ST DEGREE: ICD-10-CM

## 2021-09-20 PROCEDURE — 93296 REM INTERROG EVL PM/IDS: CPT | Performed by: INTERNAL MEDICINE

## 2021-09-20 PROCEDURE — 93294 REM INTERROG EVL PM/LDLS PM: CPT | Performed by: INTERNAL MEDICINE

## 2021-12-13 ENCOUNTER — PROCEDURE VISIT (OUTPATIENT)
Dept: CARDIOLOGY CLINIC | Age: 76
End: 2021-12-13

## 2021-12-13 ENCOUNTER — TELEPHONE (OUTPATIENT)
Dept: CARDIOLOGY CLINIC | Age: 76
End: 2021-12-13

## 2021-12-13 DIAGNOSIS — I44.0 AV BLOCK, 1ST DEGREE: ICD-10-CM

## 2021-12-13 DIAGNOSIS — Z95.0 CARDIAC PACEMAKER IN SITU: Primary | ICD-10-CM

## 2021-12-13 DIAGNOSIS — I49.5 SINUS NODE DYSFUNCTION (HCC): ICD-10-CM

## 2021-12-13 PROCEDURE — 93296 REM INTERROG EVL PM/IDS: CPT | Performed by: NURSE PRACTITIONER

## 2021-12-13 PROCEDURE — 93294 REM INTERROG EVL PM/LDLS PM: CPT | Performed by: NURSE PRACTITIONER

## 2021-12-13 NOTE — LETTER
Cardiology [address]  Phone: [phone]  Fax: [phone]    12/13/2021        [place]  [address]             Dear Keren Cerna: This is your [employer] schedule. You can che your calendar with these dates. Remember that your device is wireless and should automatically do these checks while you are sleeping. If for any reason I do not get your transmission then I will call you and ask that you send a manual transmission. If you have any questions or concerns, please call and ask for Jose Laboy at [phone]. Thank you.

## 2022-02-10 ENCOUNTER — OFFICE VISIT (OUTPATIENT)
Dept: CARDIOLOGY CLINIC | Age: 77
End: 2022-02-10
Payer: MEDICARE

## 2022-02-10 VITALS
WEIGHT: 158 LBS | HEART RATE: 64 BPM | HEIGHT: 65 IN | SYSTOLIC BLOOD PRESSURE: 132 MMHG | BODY MASS INDEX: 26.33 KG/M2 | DIASTOLIC BLOOD PRESSURE: 82 MMHG

## 2022-02-10 DIAGNOSIS — I48.0 PAROXYSMAL ATRIAL FIBRILLATION (HCC): ICD-10-CM

## 2022-02-10 DIAGNOSIS — I48.92 PAROXYSMAL ATRIAL FLUTTER (HCC): Primary | ICD-10-CM

## 2022-02-10 PROCEDURE — 99213 OFFICE O/P EST LOW 20 MIN: CPT | Performed by: INTERNAL MEDICINE

## 2022-02-10 PROCEDURE — 93000 ELECTROCARDIOGRAM COMPLETE: CPT | Performed by: INTERNAL MEDICINE

## 2022-02-10 RX ORDER — AMITRIPTYLINE HYDROCHLORIDE 25 MG/1
25 TABLET, FILM COATED ORAL NIGHTLY
COMMUNITY

## 2022-02-10 RX ORDER — DONEPEZIL HYDROCHLORIDE 5 MG/1
5 TABLET, FILM COATED ORAL NIGHTLY
COMMUNITY

## 2022-02-10 NOTE — PROGRESS NOTES
Electrophysiology FU Note      Reason for initial consultation:  Palpitations , need for pacemaker    Chief complaint : 6 month follow up    Referring physician: [facility]      Primary care physician: Rina Chopra MD      History of Present Illness: This visit (2/10/2022)      Chief Complaint   Patient presents with    6 Month Follow-Up     pt denies any cardiac symptoms. pt drinks 1 cup of coffee daily. pt does not drink or smoke. pt states decreased excercise           Previous visit:(7/31/2020)      Chief Complaint   Patient presents with    6 Month Follow-Up     patient states is feeling good. Did report a few episodes last month of feeling lightheaded and became hot and mildly sweaty. Denies CP, SOB. palpitations. No edema. Hx pacemaker, PAF.  Atrial Fibrillation           Previous visit: (7/10/2019)      Chief Complaint   Patient presents with    1 Month Follow-Up     Mikala/Mervata-1 mo f/u-cardizem decreased PPM 3/30. Patient reports she was feeling good for last 2 weeks and today she feels some haziness. No dizziness  Patient BP has been normal range so she stopped taking midodrine. No chest pain or shortness of breath    Dizziness           Previous visit:    Chief Complaint   Patient presents with         Patient of Dr. Mj Bazan, here today for follow up for c/o palpitations and for results on a three week event monitor she wore, results not yet available as patient just removed 5/1/2019, serious event noted on day 4 results in media.  Results     3 weeek event monitor results. patient with Hx Atrial fib since 2000. She reports dizziness off and on with out warning but did not pass out.  Palpitations     patient states palpitations much better after stopping Zrytec. - still has palpitations every day for short periods. Denies SOB, dizziness, and edema.      Patient with PAF episodes for some time  Patient had been on rhythmol and cardizem before - HR was dropping and BP was low  So she is only on rhythmol    During palpitations, she feels short of breath and tired but other wise she feels okay barring few dizzy spells off and on       Pastmedical history:   Past Medical History:   Diagnosis Date    Arrhythmia     AFIB    Atrial fibrillation ([address]     Cancer ([facility] [address]     padgets breast left    Constipation     H/O 24 hour EKG monitoring 2005 supraventricular ectopic activity consisted of 1225 beats, five were in one run, 8 were atrial couplets, 200 were late beats, 992 were single PACS, 20 were in trigeminy, there was one dropped beat, fastest supraventricular run consisting of five beats , averaging 148 BPM    H/O echocardiogram 2015 2009    12/15 EF 60% mild MR, TR 2009 LVS is normal, EF > 55%, no pericardial effusion    H/O exercise stress test 13    History of atrial fibrillation     History of cardiovascular stress test 2009, 10/10/2007    2009 protocol Vitaliy, EF 70% global LVS function is normal, no evidence of inducible myocardial ischemia, normal pattern of perfusion in all  regions    History of exercise stress test 2019    treadmill, normal    History of Holter monitoring 12/16/15    predominant rhythm sinus    History of LIMITED echocardiogram 2019    EF 55- 60 %, No evidence of pericardial effusion, Sclerotic aortic valve, Pacer wire noted passing through tricuspid valve,  mildly elevated pulmonary artery pressure.  Hx of cardiovascular stress test 12/10/15    Vitaliy, EF 70%, No ischemia, normal study.     Hyperlipidemia     Palpitations     Postoperative hematoma of breast 7/3/2016    Tachycardia     Wears glasses        Surgical history :   Past Surgical History:   Procedure Laterality Date    BREAST SURGERY      left nipple biopsy    CARDIAC PACEMAKER PLACEMENT Left 2019    Medtronic Dual [place]     SECTION      HYSTERECTOMY, VAGINAL      JOINT REPLACEMENT      KNEE SURGERY      right knee    OTHER SURGICAL HISTORY Left 07/03/2016    Breast exploration and evacuation of hematoma    PRE-MALIGNANT / BENIGN SKIN LESION EXCISION      breast 2016    SHOULDER SURGERY         Family history:   Family History   Problem Relation Age of Onset    Heart Attack Father     Heart Surgery Father         s/p CABG       Social history :  reports that she has never smoked. She has never used smokeless tobacco. She reports previous alcohol use. She reports that she does not use drugs. No Known Allergies    Current Outpatient Medications on File Prior to Visit   Medication Sig Dispense Refill    donepezil (ARICEPT) 5 MG tablet Take 5 mg by mouth nightly      amitriptyline (ELAVIL) 25 MG tablet Take 25 mg by mouth nightly      Cyanocobalamin (VITAMIN B-12 PO) Take 2,500 mcg by mouth      magnesium (MAGNESIUM-OXIDE) 250 MG TABS tablet Take 250 mg by mouth daily      diltiazem (CARDIZEM LA) 120 MG TB24 extended release tablet Take 1 capsule by mouth daily 90 capsule 3    montelukast (SINGULAIR) 10 MG tablet Take 10 mg by mouth nightly      Calcium Carbonate-Vitamin D (CALCIUM 500/D PO) Take 500 mg by mouth 2 times daily      aspirin 325 MG tablet Take 325 mg by mouth daily      propafenone (RYTHMOL SR) 225 MG extended release capsule Take 1 capsule by mouth 2 times daily 180 capsule 3    atorvastatin (LIPITOR) 40 MG tablet Take 40 mg by mouth daily.  traZODone (DESYREL) 50 MG tablet Take 50 mg by mouth nightly (Patient not taking: Reported on 2/10/2022)       No current facility-administered medications on file prior to visit. Review of Systems:   Review of Systems   Constitutional: Negative for activity change, chills, fatigue and fever. HENT: Negative for congestion, ear pain and tinnitus. Eyes: Negative for photophobia, pain and visual disturbance. Respiratory: Negative for cough, chest tightness, shortness of breath and wheezing. Cardiovascular: Negative for chest pain, palpitations and leg swelling. Gastrointestinal: Negative for abdominal pain, blood in stool, constipation, diarrhea, nausea and vomiting. Endocrine: Negative for cold intolerance and heat intolerance. Genitourinary: Negative for dysuria, flank pain and hematuria. Musculoskeletal: Negative for arthralgias, back pain, myalgias and neck stiffness. Skin: Negative for color change and rash. Allergic/Immunologic: Negative for food allergies. Neurological: Negative for numbness and headaches. Hematological: Does not bruise/bleed easily. Psychiatric/Behavioral: Negative for agitation, behavioral problems and confusion. Physical Examination:    /82   Pulse 64   Ht 5' 5\" (1.651 m)   Wt 158 lb (71.7 kg)   BMI 26.29 kg/m²    Wt Readings from Last 3 Encounters:   02/10/22 158 lb (71.7 kg)   08/10/21 151 lb (68.5 kg)   05/17/21 152 lb 6.4 oz (69.1 kg)     Body mass index is 26.29 kg/m². Physical Exam  Constitutional:       Appearance: She is well-developed. HENT:      Head: Normocephalic and atraumatic. Eyes:      General:         Right eye: No discharge. Conjunctiva/sclera: Conjunctivae normal.      Pupils: Pupils are equal, round, and reactive to light. Neck:      Musculoskeletal: Normal range of motion. Thyroid: No thyromegaly. Vascular: No JVD. Cardiovascular:      Rate and Rhythm: Normal rate and regular rhythm. Heart sounds: Normal heart sounds. No murmur. No friction rub. Pulmonary:      Effort: Pulmonary effort is normal. No respiratory distress. Breath sounds: Normal breath sounds. No stridor. No wheezing. Abdominal:      General: Bowel sounds are normal. There is no distension. Palpations: Abdomen is soft. Tenderness: There is no abdominal tenderness. Musculoskeletal: Normal range of motion. General: No tenderness. Skin:     General: Skin is warm and dry.       Findings: No erythema or rash. Neurological:      Mental Status: She is alert and oriented to person, place, and time. Cranial Nerves: No cranial nerve deficit. Coordination: Coordination normal.      Deep Tendon Reflexes: Reflexes normal.   Psychiatric:         Behavior: Behavior normal.           CBC:   Lab Results   Component Value Date    WBC 7.2 09/07/2020    HGB 14.6 09/07/2020    HCT 44.5 09/07/2020     09/07/2020     Lipids:  Lab Results   Component Value Date    CHOL 166 11/05/2015    TRIG 237 11/05/2015    HDL 42 11/05/2015    LDLCALC 77 11/05/2015     PT/INR:   Lab Results   Component Value Date    INR 0.98 05/28/2019        BMP:    Lab Results   Component Value Date     09/07/2020    K 3.9 09/07/2020     09/07/2020    CO2 23 09/07/2020    BUN 17 09/07/2020     CMP:   Lab Results   Component Value Date    AST 25 09/07/2020    PROT 7.1 09/07/2020    BILITOT 0.5 09/07/2020    ALKPHOS 120 09/07/2020     TSH:    Lab Results   Component Value Date    TSH 2.7 11/05/2015       EKGINTERPRETATION - EKG Interpretation:  Sinus rhythm , trifascicular block ) first degree AV block, Right bundle branch block, LAFB), PVC      IMPRESSION / RECOMMENDATIONS:     Atrial flutter paroxysmal  Tachy zeb / sick sinus sp pacemaker  PAF  Trifascicular block  HLD      Device Assessment:      The device is Medtronic pacemaker - Dual Chamber chamber      MRI Compatible : yes    Device interrogation was performed. Mode: AAIr --- DDDr     Sensing is normal. Impedence is normal.  Threshold is normal.     There has not been interval changes. Estimated battery life is 11.9 years     The underlying rhythm is AP, VS.  24.8 % atrial paced; 0.1 % ventricular paced. Atrial Arrhythmia : 3 episodes on Sept 8 Less than 2 hrs - atrial flutter episode appearance - Nothing after.      Non sustained VT episodes : No    Sustained VT episodes : No    Patient activity reported 3.6 hrs/day    The patient is PARTIAL ATRIAL pacemaker dependent. Reports she has no symptoms and doing very well and wants to continue with present medications only    He has no complaints and she does not want to be on anticoagulation despite having a discussion about stroke versus bleeding    Recommend her to continue with  active life style    Patient only on aspirin - hesitant to take anticoagulation    Patient Martha Thapa is 2 but age is really only risk factor - so aspirin is okay (per new recommendations - sex is not an individual risk factor if its only with age)     Patient already reports that she bleeds easy just with aspirin so she is not willing to take anticoagulation and understands risk of stroke. Thanks again for allowing me to participate in care of this patient. Please call me if you have any questions. With best regards.       Victoriano Sharma MD, 2/10/2022 2:53 PM

## 2022-03-25 ENCOUNTER — PROCEDURE VISIT (OUTPATIENT)
Dept: CARDIOLOGY CLINIC | Age: 77
End: 2022-03-25
Payer: MEDICARE

## 2022-03-25 DIAGNOSIS — I49.5 SINUS NODE DYSFUNCTION (HCC): ICD-10-CM

## 2022-03-25 DIAGNOSIS — I44.0 AV BLOCK, 1ST DEGREE: ICD-10-CM

## 2022-03-25 DIAGNOSIS — Z95.0 CARDIAC PACEMAKER IN SITU: Primary | ICD-10-CM

## 2022-03-25 PROCEDURE — 93294 REM INTERROG EVL PM/LDLS PM: CPT | Performed by: NURSE PRACTITIONER

## 2022-03-25 PROCEDURE — 93296 REM INTERROG EVL PM/IDS: CPT | Performed by: NURSE PRACTITIONER

## 2022-06-01 ENCOUNTER — OFFICE VISIT (OUTPATIENT)
Dept: CARDIOLOGY CLINIC | Age: 77
End: 2022-06-01
Payer: MEDICARE

## 2022-06-01 VITALS
SYSTOLIC BLOOD PRESSURE: 118 MMHG | HEIGHT: 65 IN | WEIGHT: 155.2 LBS | DIASTOLIC BLOOD PRESSURE: 72 MMHG | HEART RATE: 70 BPM | BODY MASS INDEX: 25.86 KG/M2

## 2022-06-01 DIAGNOSIS — I73.9 CLAUDICATION (HCC): ICD-10-CM

## 2022-06-01 DIAGNOSIS — I48.0 PAROXYSMAL ATRIAL FIBRILLATION (HCC): Primary | ICD-10-CM

## 2022-06-01 PROCEDURE — 99214 OFFICE O/P EST MOD 30 MIN: CPT | Performed by: INTERNAL MEDICINE

## 2022-06-01 PROCEDURE — 93000 ELECTROCARDIOGRAM COMPLETE: CPT | Performed by: INTERNAL MEDICINE

## 2022-06-01 PROCEDURE — 1123F ACP DISCUSS/DSCN MKR DOCD: CPT | Performed by: INTERNAL MEDICINE

## 2022-06-01 NOTE — PROGRESS NOTES
CARDIOLOGY NOTE      6/1/2022    RE: Ivania Greenwood  ([id number])                               TO:  Dr. Ilene Covington MD            CHIEF COMPLAINT   Deena Roblero is a 68 y.o. female who was seen today for management of arrhythmias                                    HPI:                   Pt has h/o arrhythmias/PAF/hyperlipidemia, seen today for follow-up.  Pt has no cardiac complaints  Has peripheral neuropathy  Has le pain bilaterally in both legs on and off burning sensation with exertion and without exertion has been told that she has neuropathy    Ivania Greenwood has the following history recorded in care path:  Patient Active Problem List    Diagnosis Date Noted    Irregular heart rate 10/25/2016    Postoperative hematoma of breast 07/03/2016    Paroxysmal atrial fibrillation (HCC)     Tachycardia     Hyperlipidemia     History of atrial fibrillation     Arrhythmia     Tachycardia-bradycardia syndrome ([facility] [address] 02/07/2021    S/P placement of cardiac pacemaker 05/30/2019    Palpitations     Benign liver cyst 11/06/2018    Mediastinal lymphadenopathy 11/06/2018    Hilar adenopathy 11/06/2018    Cancer of left nipple ([facility] [address] 11/06/2018    Eosinophilia 11/06/2018     Current Outpatient Medications   Medication Sig Dispense Refill    donepezil (ARICEPT) 5 MG tablet Take 5 mg by mouth nightly      amitriptyline (ELAVIL) 25 MG tablet Take 25 mg by mouth nightly      Cyanocobalamin (VITAMIN B-12 PO) Take 2,500 mcg by mouth      magnesium (MAGNESIUM-OXIDE) 250 MG TABS tablet Take 250 mg by mouth daily      traZODone (DESYREL) 50 MG tablet Take 50 mg by mouth nightly (Patient not taking: Reported on 2/10/2022)      diltiazem (CARDIZEM LA) 120 MG TB24 extended release tablet Take 1 capsule by mouth daily 90 capsule 3    montelukast (SINGULAIR) 10 MG tablet Take 10 mg by mouth nightly      Calcium Carbonate-Vitamin D (CALCIUM 500/D PO) Take 500 mg by mouth 2 times daily      aspirin 325 MG tablet Take Medtronic Dual [place]     SECTION      HYSTERECTOMY, VAGINAL      JOINT REPLACEMENT      KNEE SURGERY      right knee    KNEE SURGERY Left     OTHER SURGICAL HISTORY Left 2016    Breast exploration and evacuation of hematoma    PRE-MALIGNANT / BENIGN SKIN LESION EXCISION      breast 2016    SHOULDER SURGERY        As reviewed   Family History   Problem Relation Age of Onset    Heart Attack Father     Heart Surgery Father         s/p CABG     Social History     Tobacco Use    Smoking status: Never Smoker    Smokeless tobacco: Never Used   Substance Use Topics    Alcohol use: Not Currently     Comment: ETOH Intake: occasional, Caffeine Intake: 1 cup coffee a day. Objective: There were no vitals filed for this visit. There were no vitals taken for this visit. No flowsheet data found. Wt Readings from Last 3 Encounters:   22 155 lb 12.8 oz (70.7 kg)   02/10/22 158 lb (71.7 kg)   08/10/21 151 lb (68.5 kg)     There is no height or weight on file to calculate BMI. GENERAL - Alert, oriented, pleasant, in no apparent distress. EYES: No jaundice, no conjunctival pallor. SKIN: It is warm & dry. No rashes. No Echhymosis    HEENT  No clinically significant abnormalities seen. Neck - Supple. No jugular venous distention noted. No carotid bruits. Cardiovascular  Normal S1 and S2 without obvious murmur or gallop. Extremities - No cyanosis, clubbing, or significant edema. Pulmonary  No respiratory distress. No wheezes or rales. Abdomen  No masses, tenderness, or organomegaly. Musculoskeletal  No significant edema. No joint deformities. No muscle wasting. Neurologic  Cranial nerves II through XII are grossly intact. There were no gross focal neurologic abnormalities.     Lab Review   Lab Results   Component Value Date    CKTOTAL 60 2018    TROPONINT <0.010 2020     BNP:  No results found for: BNP  PT/INR:    Lab Results   Component Value Date INR 0.98 05/28/2019     No results found for: LABA1C  Lab Results   Component Value Date    WBC 7.2 09/07/2020    HCT 44.5 09/07/2020    MCV 91.2 09/07/2020     09/07/2020     Lab Results   Component Value Date    CHOL 166 11/05/2015    TRIG 237 11/05/2015    HDL 42 11/05/2015    LDLCALC 77 11/05/2015     Lab Results   Component Value Date    ALT 12 09/07/2020    AST 25 09/07/2020     BMP:    Lab Results   Component Value Date     09/07/2020    K 3.9 09/07/2020     09/07/2020    CO2 23 09/07/2020    BUN 17 09/07/2020    CREATININE 0.9 09/07/2020     CMP:   Lab Results   Component Value Date     09/07/2020    K 3.9 09/07/2020     09/07/2020    CO2 23 09/07/2020    BUN 17 09/07/2020    PROT 7.1 09/07/2020     TSH:    Lab Results   Component Value Date    TSH 2.7 11/05/2015    TSHHS 4.140 09/04/2018           Assessment & Plan:    -Arrhythmias had PAF on Rythmol and Cardizem has not had any more symptoms    -  LIPID MANAGEMENT:  Importance of lipid levels discussed with patient   and patient was given dietary advice. NCEP- ATP III guidelines reviewed with patient. -   Changes  in medicines made: No     Patient is on Lipitor 40 mg p.o. daily we will check the LDL    -Has a permanent pacemaker implanted on [employer]                    - le pain; starla, symptoms are suggestive of neuropathy however will obtain a STARLA               Bridgette Gutierrez MD    [facility] [place]    Please note this report has been partially produced using speech recognition software and may contain errors related to that system including errors in grammar, punctuation, and spelling, as well as words and phrases that may be inappropriate. If there are any questions or concerns please feel free to contact the dictating provider for clarification.

## 2022-06-22 ENCOUNTER — PROCEDURE VISIT (OUTPATIENT)
Dept: CARDIOLOGY CLINIC | Age: 77
End: 2022-06-22
Payer: MEDICARE

## 2022-06-22 DIAGNOSIS — I73.9 CLAUDICATION (HCC): ICD-10-CM

## 2022-06-22 PROCEDURE — 93922 UPR/L XTREMITY ART 2 LEVELS: CPT | Performed by: INTERNAL MEDICINE

## 2022-06-25 PROCEDURE — 93294 REM INTERROG EVL PM/LDLS PM: CPT | Performed by: NURSE PRACTITIONER

## 2022-06-25 PROCEDURE — 93296 REM INTERROG EVL PM/IDS: CPT | Performed by: NURSE PRACTITIONER

## 2022-06-27 ENCOUNTER — PROCEDURE VISIT (OUTPATIENT)
Dept: CARDIOLOGY CLINIC | Age: 77
End: 2022-06-27
Payer: MEDICARE

## 2022-06-27 DIAGNOSIS — I44.0 AV BLOCK, 1ST DEGREE: ICD-10-CM

## 2022-06-27 DIAGNOSIS — Z95.0 CARDIAC PACEMAKER IN SITU: Primary | ICD-10-CM

## 2022-06-27 DIAGNOSIS — I49.5 SINUS NODE DYSFUNCTION (HCC): ICD-10-CM

## 2022-07-01 ENCOUNTER — TELEPHONE (OUTPATIENT)
Dept: CARDIOLOGY CLINIC | Age: 77
End: 2022-07-01

## 2022-08-11 ENCOUNTER — OFFICE VISIT (OUTPATIENT)
Dept: CARDIOLOGY CLINIC | Age: 77
End: 2022-08-11
Payer: MEDICARE

## 2022-08-11 VITALS
WEIGHT: 154.8 LBS | HEIGHT: 65 IN | BODY MASS INDEX: 25.79 KG/M2 | SYSTOLIC BLOOD PRESSURE: 102 MMHG | DIASTOLIC BLOOD PRESSURE: 70 MMHG

## 2022-08-11 DIAGNOSIS — Z86.79 HISTORY OF ATRIAL FIBRILLATION: Primary | ICD-10-CM

## 2022-08-11 PROCEDURE — 93000 ELECTROCARDIOGRAM COMPLETE: CPT | Performed by: NURSE PRACTITIONER

## 2022-08-11 PROCEDURE — 1123F ACP DISCUSS/DSCN MKR DOCD: CPT | Performed by: NURSE PRACTITIONER

## 2022-08-11 PROCEDURE — 99213 OFFICE O/P EST LOW 20 MIN: CPT | Performed by: NURSE PRACTITIONER

## 2022-08-11 NOTE — PROGRESS NOTES
Electrophysiology FU Note      Reason for initial consultation:  Palpitations , need for pacemaker    Chief complaint : 6 month follow up fibrillation    Referring physician: Mehreen Perez      Primary care physician: Severo Kilts, MD      History of Present Illness: This visit 8/11/2022  Patient here today for 6-month follow-up on atrial fibrillation. She reports she is felt well. She denies chest pain, palpitations, shortness of breath, lightheadedness, dizziness, edema or syncope. He denies issues with bleeding. She is taking her medications as prescribed. Previous visit (2/10/2022)      Chief Complaint   Patient presents with    6 Month Follow-Up     pt denies any cardiac symptoms. pt drinks 1 cup of coffee daily. pt does not drink or smoke. pt states decreased excercise           Previous visit:(7/31/2020)      Chief Complaint   Patient presents with    6 Month Follow-Up     patient states is feeling good. Did report a few episodes last month of feeling lightheaded and became hot and mildly sweaty. Denies CP, SOB. palpitations. No edema. Hx pacemaker, PAF. Atrial Fibrillation           Previous visit: (7/10/2019)      Chief Complaint   Patient presents with    1 Month Follow-Up     Mikala/Anibal-1 mo f/u-cardizem decreased PPM 3/30. Patient reports she was feeling good for last 2 weeks and today she feels some haziness. No dizziness  Patient BP has been normal range so she stopped taking midodrine. No chest pain or shortness of breath    Dizziness           Previous visit:    Chief Complaint   Patient presents with         Patient of Dr. Florencia Phan, here today for follow up for c/o palpitations and for results on a three week event monitor she wore, results not yet available as patient just removed 5/1/2019, serious event noted on day 4 results in media. Results     3 weeek event monitor results. patient with Hx Atrial fib since 2000.  She reports dizziness off and on with out warning but did not pass out. Palpitations     patient states palpitations much better after stopping Zrytec. - still has palpitations every day for short periods. Denies SOB, dizziness, and edema. Patient with PAF episodes for some time  Patient had been on rhythmol and cardizem before - HR was dropping and BP was low  So she is only on rhythmol    During palpitations, she feels short of breath and tired but other wise she feels okay barring few dizzy spells off and on       Pastmedical history:   Past Medical History:   Diagnosis Date    Arrhythmia     AFIB    Atrial fibrillation ([facility]     Cancer ([facility] [facility]     padgets breast left    Constipation     H/O 24 hour EKG monitoring 12/27/2005 12/27/2005 supraventricular ectopic activity consisted of 1225 beats, five were in one run, 8 were atrial couplets, 200 were late beats, 992 were single PACS, 20 were in trigeminy, there was one dropped beat, fastest supraventricular run consisting of five beats , averaging 148 BPM    H/O echocardiogram 12/16/2015 08/13/2009    12/15 EF 60% mild MR, TR 08/13/2009 LVS is normal, EF > 55%, no pericardial effusion    H/O exercise stress test 5/7/13    History of atrial fibrillation     History of cardiovascular stress test 08/13/2009, 10/10/2007    08/13/2009 protocol Vitaliy, EF 70% global LVS function is normal, no evidence of inducible myocardial ischemia, normal pattern of perfusion in all  regions    History of exercise stress test 02/27/2019    treadmill, normal    History of Holter monitoring 12/16/15    predominant rhythm sinus    History of LIMITED echocardiogram 06/07/2019    EF 55- 60 %, No evidence of pericardial effusion, Sclerotic aortic valve, Pacer wire noted passing through tricuspid valve,  mildly elevated pulmonary artery pressure. Hx of cardiovascular stress test 12/10/15    Vitaliy, EF 70%, No ischemia, normal study.     Hyperlipidemia     Palpitations     Postoperative hematoma of breast 7/3/2016    Tachycardia     Wears glasses        Surgical history :   Past Surgical History:   Procedure Laterality Date    BREAST SURGERY      left nipple biopsy    CARDIAC PACEMAKER PLACEMENT Left 2019    [employer] Dual [place]     SECTION      HYSTERECTOMY, VAGINAL      JOINT REPLACEMENT      KNEE SURGERY      right knee    KNEE SURGERY Left     OTHER SURGICAL HISTORY Left 2016    Breast exploration and evacuation of hematoma    PRE-MALIGNANT / BENIGN SKIN LESION EXCISION      breast 2016    SHOULDER SURGERY         Family history:   Family History   Problem Relation Age of Onset    Heart Attack Father     Heart Surgery Father         s/p CABG       Social history :  reports that she has never smoked. She has never used smokeless tobacco. She reports that she does not currently use alcohol. She reports that she does not use drugs. No Known Allergies    Current Outpatient Medications on File Prior to Visit   Medication Sig Dispense Refill    MAGNESIUM CITRATE PO Take 250 mg by mouth 2 times daily      donepezil (ARICEPT) 5 MG tablet Take 5 mg by mouth nightly      amitriptyline (ELAVIL) 25 MG tablet Take 25 mg by mouth nightly      Cyanocobalamin (VITAMIN B-12 PO) Take 2,500 mcg by mouth (Patient not taking: Reported on 2022)      magnesium (MAGNESIUM-OXIDE) 250 MG TABS tablet Take 250 mg by mouth daily      diltiazem (CARDIZEM LA) 120 MG TB24 extended release tablet Take 1 capsule by mouth daily 90 capsule 3    montelukast (SINGULAIR) 10 MG tablet Take 10 mg by mouth nightly      Calcium Carbonate-Vitamin D (CALCIUM 500/D PO) Take 500 mg by mouth 2 times daily      aspirin 325 MG tablet Take 325 mg by mouth daily      propafenone (RYTHMOL SR) 225 MG extended release capsule Take 1 capsule by mouth 2 times daily 180 capsule 3    atorvastatin (LIPITOR) 40 MG tablet Take 40 mg by mouth daily. No current facility-administered medications on file prior to visit. Review of Systems:   Review of Systems   Constitutional: Negative for activity change, chills, fatigue and fever. HENT: Negative for congestion, ear pain and tinnitus. Eyes: Negative for photophobia, pain and visual disturbance. Respiratory: Negative for cough, chest tightness, shortness of breath and wheezing. Cardiovascular: Negative for chest pain, palpitations and leg swelling. Gastrointestinal: Negative for abdominal pain, blood in stool, constipation, diarrhea, nausea and vomiting. Endocrine: Negative for cold intolerance and heat intolerance. Genitourinary: Negative for dysuria, flank pain and hematuria. Musculoskeletal: Negative for arthralgias, back pain, myalgias and neck stiffness. Skin: Negative for color change and rash. Allergic/Immunologic: Negative for food allergies. Neurological: Negative for numbness and headaches. Hematological: Does not bruise/bleed easily. Psychiatric/Behavioral: Negative for agitation, behavioral problems and confusion. Physical Examination:    /70   Ht 5' 5\" (1.651 m)   Wt 154 lb 12.8 oz (70.2 kg)   BMI 25.76 kg/m²    Wt Readings from Last 3 Encounters:   08/11/22 154 lb 12.8 oz (70.2 kg)   06/01/22 155 lb 3.2 oz (70.4 kg)   03/08/22 155 lb 12.8 oz (70.7 kg)     Body mass index is 25.76 kg/m². Physical Exam  Constitutional:       Appearance: She is well-developed. HENT:      Head: Normocephalic and atraumatic. Eyes:      General:         Right eye: No discharge. Conjunctiva/sclera: Conjunctivae normal.      Pupils: Pupils are equal, round, and reactive to light. Neck:      Musculoskeletal: Normal range of motion. Thyroid: No thyromegaly. Vascular: No JVD. Cardiovascular:      Rate and Rhythm: Normal rate and regular rhythm. Heart sounds: Normal heart sounds. No murmur. No friction rub. Pulmonary:      Effort: Pulmonary effort is normal. No respiratory distress.       Breath sounds: Normal breath sounds. No stridor. No wheezing. Abdominal:      General: Bowel sounds are normal. There is no distension. Palpations: Abdomen is soft. Tenderness: There is no abdominal tenderness. Musculoskeletal: Normal range of motion. General: No tenderness. Skin:     General: Skin is warm and dry. Findings: No erythema or rash. Neurological:      Mental Status: She is alert and oriented to person, place, and time. Cranial Nerves: No cranial nerve deficit. Coordination: Coordination normal.      Deep Tendon Reflexes: Reflexes normal.   Psychiatric:         Behavior: Behavior normal.           CBC:   Lab Results   Component Value Date/Time    WBC 7.2 09/07/2020 01:45 PM    HGB 14.6 09/07/2020 01:45 PM    HCT 44.5 09/07/2020 01:45 PM     09/07/2020 01:45 PM     Lipids:  Lab Results   Component Value Date    CHOL 166 11/05/2015    TRIG 237 11/05/2015    HDL 42 11/05/2015    LDLCALC 77 11/05/2015     PT/INR:   Lab Results   Component Value Date/Time    INR 0.98 05/28/2019 04:07 PM        BMP:    Lab Results   Component Value Date     09/07/2020    K 3.9 09/07/2020     09/07/2020    CO2 23 09/07/2020    BUN 17 09/07/2020     CMP:   Lab Results   Component Value Date    AST 25 09/07/2020    PROT 7.1 09/07/2020    BILITOT 0.5 09/07/2020    ALKPHOS 120 09/07/2020     TSH:    Lab Results   Component Value Date/Time    TSH 2.7 11/05/2015 12:00 AM       EKGINTERPRETATION - EKG Interpretation:  atrial paced    IMPRESSION / RECOMMENDATIONS:     Atrial flutter paroxysmal  Tachy zeb / sick sinus sp pacemaker  PAF  Trifascicular block  HLD    EKG obtained and reviewed patient noted to be atrial paced.   QTC is 447  I did review patient's recent transmission of her pacemaker and patient did not have any atrial fibrillation or atrial flutter on her pacemaker  We will continue Cardizem 120 mg daily and Rythmol  mg twice daily  Patient is taking aspirin 325 mg daily  We will continue magnesium 250 mg daily  Overall patient is doing well. We will follow-up in 6 months or sooner if needed    Thanks again for allowing me to participate in care of this patient. Please call me if you have any questions. With best regards.       RAJIV Giron - CNP, 8/11/2022 3:46 PM

## 2022-08-11 NOTE — PATIENT INSTRUCTIONS
Please be informed that if you contact our office outside of normal business hours the physician on call cannot help with any scheduling or rescheduling issues, procedure instruction questions or any type of medication issue. We advise you for any urgent/emergency that you go to the nearest emergency room! PLEASE CALL OUR OFFICE DURING NORMAL BUSINESS HOURS    Monday - Friday   8 am to 5 pm    [place]: [address]: [phone]    [place]:  [phone]      **It is YOUR responsibilty to bring medication bottles and/or updated medication list to [address] [place] [address].  This will allow us to better serve you and all your healthcare needs**

## 2022-10-01 PROCEDURE — 93294 REM INTERROG EVL PM/LDLS PM: CPT | Performed by: INTERNAL MEDICINE

## 2022-10-01 PROCEDURE — 93296 REM INTERROG EVL PM/IDS: CPT | Performed by: INTERNAL MEDICINE

## 2022-10-03 ENCOUNTER — PROCEDURE VISIT (OUTPATIENT)
Dept: CARDIOLOGY CLINIC | Age: 77
End: 2022-10-03

## 2022-10-03 ENCOUNTER — TELEPHONE (OUTPATIENT)
Dept: CARDIOLOGY CLINIC | Age: 77
End: 2022-10-03

## 2022-10-03 DIAGNOSIS — Z95.0 CARDIAC PACEMAKER IN SITU: Primary | ICD-10-CM

## 2022-10-03 DIAGNOSIS — I44.0 AV BLOCK, 1ST DEGREE: ICD-10-CM

## 2022-10-03 DIAGNOSIS — I49.5 SINUS NODE DYSFUNCTION (HCC): ICD-10-CM

## 2022-10-03 NOTE — LETTER
Cardiology [address] [facility],[address]  Phone: [phone]  Fax: [phone]    10/3/2022        [facility]  [address]  [address] [place] [id number]            Dear Susan Wagoner: This is your [employer] schedule. You can che your calendar with these dates. Remember that your device is wireless and should automatically do these checks while you are sleeping. If for any reason I do not get your transmission then I will call you and ask that you send a manual transmission. If you have any questions or concerns, please call and ask for Lety Sung at [phone]. Thank you.

## 2022-12-21 ENCOUNTER — OFFICE VISIT (OUTPATIENT)
Dept: CARDIOLOGY CLINIC | Age: 77
End: 2022-12-21
Payer: MEDICARE

## 2022-12-21 VITALS
HEART RATE: 68 BPM | HEIGHT: 65 IN | WEIGHT: 160 LBS | SYSTOLIC BLOOD PRESSURE: 126 MMHG | DIASTOLIC BLOOD PRESSURE: 74 MMHG | BODY MASS INDEX: 26.66 KG/M2

## 2022-12-21 DIAGNOSIS — E78.2 MIXED HYPERLIPIDEMIA: ICD-10-CM

## 2022-12-21 DIAGNOSIS — I48.91 ATRIAL FIBRILLATION, UNSPECIFIED TYPE (HCC): Primary | ICD-10-CM

## 2022-12-21 PROCEDURE — 93000 ELECTROCARDIOGRAM COMPLETE: CPT | Performed by: NURSE PRACTITIONER

## 2022-12-21 PROCEDURE — 99214 OFFICE O/P EST MOD 30 MIN: CPT | Performed by: NURSE PRACTITIONER

## 2022-12-21 PROCEDURE — 1123F ACP DISCUSS/DSCN MKR DOCD: CPT | Performed by: NURSE PRACTITIONER

## 2022-12-21 ASSESSMENT — ENCOUNTER SYMPTOMS
SHORTNESS OF BREATH: 0
ORTHOPNEA: 0

## 2022-12-21 NOTE — PROGRESS NOTES
12/21/2022  Primary cardiologist: Dr. Sharlett Holter:   Fanta Schneider  is an established 68 y.o.  female here for a 6 month follow up on PAF      SUBJECTIVE/OBJECTIVE:  Fanta Schneider is a 68 y.o. female with a history of paroxysmal atrial flutter, tachybradycardia/sick sinus syndrome status post pacemaker, PAF, trifascicular block, hyperlipidemia, peripheral neuropathy and hyperlipidemia      HPI :   Fanta Schneider reports overall she is feeling fairly well. On occasion she notes when taking a deep breath she will have a dull pain that will last until she exhales. She denies palpitations, lightheadedness or dizziness. Notes her exercise level has decreased over the past year. Review of Systems   Constitutional: Negative for diaphoresis and malaise/fatigue. Cardiovascular:  Negative for chest pain, claudication, dyspnea on exertion, irregular heartbeat, leg swelling, near-syncope, orthopnea, palpitations and paroxysmal nocturnal dyspnea. Respiratory:  Negative for shortness of breath. Neurological:  Negative for dizziness and light-headedness. Vitals:    12/21/22 0950   BP: 126/74   Pulse: 68   Weight: 160 lb (72.6 kg)   Height: 5' 5\" (1.651 m)     No flowsheet data found. Wt Readings from Last 3 Encounters:   12/21/22 160 lb (72.6 kg)   08/11/22 154 lb 12.8 oz (70.2 kg)   06/01/22 155 lb 3.2 oz (70.4 kg)     Body mass index is 26.63 kg/m². Physical Exam  Vitals reviewed. Constitutional:       Appearance: Normal appearance. HENT:      Head: Normocephalic and atraumatic. Mouth/Throat:      Mouth: Mucous membranes are moist.   Eyes:      Extraocular Movements: Extraocular movements intact. Pupils: Pupils are equal, round, and reactive to light. Neck:      Vascular: No carotid bruit. Cardiovascular:      Rate and Rhythm: Normal rate and regular rhythm. Pulses: Normal pulses.       Comments: Left sided pacer pocket intact   Pulmonary:      Effort: Pulmonary effort is normal.      Breath sounds: Normal breath sounds. No rales. Chest:      Chest wall: No tenderness. Abdominal:      General: There is no distension. Palpations: Abdomen is soft. Tenderness: There is no abdominal tenderness. Musculoskeletal:      Cervical back: No tenderness. Right lower leg: No edema. Left lower leg: No edema. Skin:     General: Skin is warm and dry. Capillary Refill: Capillary refill takes less than 2 seconds. Neurological:      General: No focal deficit present. Mental Status: She is alert and oriented to person, place, and time. Psychiatric:         Mood and Affect: Mood normal.         Behavior: Behavior normal.              Current Outpatient Medications   Medication Sig Dispense Refill    Zolpidem Tartrate (AMBIEN PO) Take by mouth      donepezil (ARICEPT) 5 MG tablet Take 5 mg by mouth nightly      Cyanocobalamin (VITAMIN B-12 PO) Take 2,500 mcg by mouth      magnesium (MAGNESIUM-OXIDE) 250 MG TABS tablet Take 250 mg by mouth daily      diltiazem (CARDIZEM LA) 120 MG TB24 extended release tablet Take 1 capsule by mouth daily 90 capsule 3    montelukast (SINGULAIR) 10 MG tablet Take 10 mg by mouth nightly      Calcium Carbonate-Vitamin D (CALCIUM 500/D PO) Take 500 mg by mouth 2 times daily      aspirin 325 MG tablet Take 325 mg by mouth daily      propafenone (RYTHMOL SR) 225 MG extended release capsule Take 1 capsule by mouth 2 times daily 180 capsule 3    atorvastatin (LIPITOR) 40 MG tablet Take 40 mg by mouth daily. MAGNESIUM CITRATE PO Take 250 mg by mouth 2 times daily       No current facility-administered medications for this visit. All pertinent data reviewed and discussed with patient       ASSESSMENT/PLAN:      Atrial fibrillation/atrial flutter  No atrial flutter noted on recent pacemaker analysis. EKG today shows atrially paced rhythm  QTc 454 msec:  We will continue with Rythmol  Lorena Vas score of 3 recommend ( age and sex )  Continue with aspirin only no need for anticoagulation    Atrial Fibrillation CHADSVASC2 Score Stroke Risk:   68 y.o. > 76 - 2    female Female - 1   CHF HX: No - 0   HTN HX: No - 0   Stroke/TIA/Thromboembolism No - 0   Vascular Disease HX: No - 0   Diabetes Mellitus No - 0   CHADSVASC 2 Score 2      Annual Stroke Risk 2.2%- moderate-high            Pacemaker  Analysis reviewed from 10/03/2022:   No atrial fib noted  Stable remote monitoring noted. Continue with Q3 month monitoring      Dyslipidemia   No recent lipids   Had labs with PCP- request copy  She is on atorvastatin. No reported adverse events or reported side effects from medication(s) and is stable on current dose. encouraged healthy eating habits including low fat -low /cholesterol diet        Tests ordered:  none   Follow-up  6 mon    Signed:  RAJIV Jean Baptiste CNP, 12/21/2022, 9:57 AM    An electronic signature was used to authenticate this note. Please note this report has been partially produced using speech recognition software and may contain errors related to that system including errors in grammar, punctuation, and spelling, as well as words and phrases that may be inappropriate. If there are any questions or concerns please feel free to contact the dictating provider for clarification.

## 2023-01-12 ENCOUNTER — PROCEDURE VISIT (OUTPATIENT)
Dept: CARDIOLOGY CLINIC | Age: 78
End: 2023-01-12

## 2023-01-12 DIAGNOSIS — I44.0 AV BLOCK, 1ST DEGREE: ICD-10-CM

## 2023-01-12 DIAGNOSIS — Z95.0 CARDIAC PACEMAKER IN SITU: ICD-10-CM

## 2023-01-12 DIAGNOSIS — I49.5 SINUS NODE DYSFUNCTION (HCC): ICD-10-CM

## 2023-02-15 ENCOUNTER — OFFICE VISIT (OUTPATIENT)
Dept: CARDIOLOGY CLINIC | Age: 78
End: 2023-02-15
Payer: MEDICARE

## 2023-02-15 VITALS
BODY MASS INDEX: 26.42 KG/M2 | HEIGHT: 65 IN | WEIGHT: 158.6 LBS | HEART RATE: 64 BPM | DIASTOLIC BLOOD PRESSURE: 76 MMHG | SYSTOLIC BLOOD PRESSURE: 126 MMHG

## 2023-02-15 DIAGNOSIS — I38 VALVULAR HEART DISEASE: ICD-10-CM

## 2023-02-15 DIAGNOSIS — R41.89 COGNITIVE IMPAIRMENT: ICD-10-CM

## 2023-02-15 DIAGNOSIS — I48.0 PAROXYSMAL ATRIAL FIBRILLATION (HCC): Primary | ICD-10-CM

## 2023-02-15 DIAGNOSIS — Z95.0 CARDIAC PACEMAKER IN SITU: ICD-10-CM

## 2023-02-15 PROCEDURE — 93288 INTERROG EVL PM/LDLS PM IP: CPT | Performed by: NURSE PRACTITIONER

## 2023-02-15 PROCEDURE — 99214 OFFICE O/P EST MOD 30 MIN: CPT | Performed by: NURSE PRACTITIONER

## 2023-02-15 PROCEDURE — 1123F ACP DISCUSS/DSCN MKR DOCD: CPT | Performed by: NURSE PRACTITIONER

## 2023-02-28 ENCOUNTER — PROCEDURE VISIT (OUTPATIENT)
Dept: CARDIOLOGY CLINIC | Age: 78
End: 2023-02-28
Payer: MEDICARE

## 2023-02-28 DIAGNOSIS — I63.20 ISCHEMIC CEREBRAL VASCULAR ACCIDENT (CVA) DUE TO STENOSIS OF LARGE EXTRACRANIAL ARTERY (HCC): ICD-10-CM

## 2023-02-28 DIAGNOSIS — R41.89 COGNITIVE IMPAIRMENT: ICD-10-CM

## 2023-02-28 PROCEDURE — 93880 EXTRACRANIAL BILAT STUDY: CPT | Performed by: INTERNAL MEDICINE

## 2023-03-01 ENCOUNTER — PROCEDURE VISIT (OUTPATIENT)
Dept: CARDIOLOGY CLINIC | Age: 78
End: 2023-03-01
Payer: MEDICARE

## 2023-03-01 DIAGNOSIS — I38 VALVULAR HEART DISEASE: ICD-10-CM

## 2023-03-01 LAB
LV EF: 58 %
LVEF MODALITY: NORMAL

## 2023-03-01 PROCEDURE — 93306 TTE W/DOPPLER COMPLETE: CPT | Performed by: INTERNAL MEDICINE

## 2023-04-24 ENCOUNTER — PROCEDURE VISIT (OUTPATIENT)
Dept: CARDIOLOGY CLINIC | Age: 78
End: 2023-04-24

## 2023-04-24 DIAGNOSIS — Z95.0 CARDIAC PACEMAKER IN SITU: Primary | ICD-10-CM

## 2023-04-24 DIAGNOSIS — I49.5 SINUS NODE DYSFUNCTION (HCC): ICD-10-CM

## 2023-04-24 DIAGNOSIS — I44.0 AV BLOCK, 1ST DEGREE: ICD-10-CM

## 2023-06-22 ENCOUNTER — OFFICE VISIT (OUTPATIENT)
Dept: CARDIOLOGY CLINIC | Age: 78
End: 2023-06-22
Payer: MEDICARE

## 2023-06-22 VITALS
OXYGEN SATURATION: 96 % | DIASTOLIC BLOOD PRESSURE: 66 MMHG | WEIGHT: 159.4 LBS | BODY MASS INDEX: 26.56 KG/M2 | SYSTOLIC BLOOD PRESSURE: 110 MMHG | RESPIRATION RATE: 12 BRPM | HEART RATE: 66 BPM | HEIGHT: 65 IN

## 2023-06-22 DIAGNOSIS — I48.0 PAROXYSMAL ATRIAL FIBRILLATION (HCC): Primary | ICD-10-CM

## 2023-06-22 DIAGNOSIS — E78.2 MIXED HYPERLIPIDEMIA: ICD-10-CM

## 2023-06-22 PROCEDURE — 1123F ACP DISCUSS/DSCN MKR DOCD: CPT | Performed by: NURSE PRACTITIONER

## 2023-06-22 PROCEDURE — 99214 OFFICE O/P EST MOD 30 MIN: CPT | Performed by: NURSE PRACTITIONER

## 2023-06-22 ASSESSMENT — ENCOUNTER SYMPTOMS
SHORTNESS OF BREATH: 0
ORTHOPNEA: 0

## 2023-06-22 NOTE — PROGRESS NOTES
6  6/22/2023  Primary cardiologist: Dr. Ben Rascon:   Hanane Pearson  is an established 66 y.o.  female here for a 6 month follow up on PAF      SUBJECTIVE/OBJECTIVE:  Hanane Pearson is a 66 y.o. female with a history of paroxysmal atrial flutter, tachybradycardia/sick sinus syndrome s/p pacemaker, PAF, trifascicular block, hyperlipidemia, peripheral neuropathy and hyperlipidemia      HPI :   Hanane Pearson reports she is feeling well. She had one episode of palpitations a couple weeks ago. States they were brief in nature. No associated symptoms. She is following every three month remote pacemaker monitoring. Review of Systems   Constitutional: Negative for diaphoresis and malaise/fatigue. Cardiovascular:  Positive for palpitations. Negative for chest pain, claudication, dyspnea on exertion, irregular heartbeat, leg swelling, near-syncope, orthopnea and paroxysmal nocturnal dyspnea. Respiratory:  Negative for shortness of breath. Neurological:  Negative for dizziness and light-headedness. Vitals:    06/22/23 1147   BP: 110/66   Site: Left Upper Arm   Position: Sitting   Cuff Size: Medium Adult   Pulse: 66   Resp: 12   SpO2: 96%   Weight: 159 lb 6.4 oz (72.3 kg)   Height: 5' 5\" (1.651 m)     No flowsheet data found. Wt Readings from Last 3 Encounters:   06/22/23 159 lb 6.4 oz (72.3 kg)   04/05/23 158 lb (71.7 kg)   02/15/23 158 lb 9.6 oz (71.9 kg)     Body mass index is 26.53 kg/m². Physical Exam  Constitutional:       Appearance: Normal appearance. HENT:      Head: Normocephalic and atraumatic. Eyes:      Extraocular Movements: Extraocular movements intact. Pupils: Pupils are equal, round, and reactive to light. Neck:      Vascular: No carotid bruit, hepatojugular reflux or JVD. Cardiovascular:      Rate and Rhythm: Normal rate and regular rhythm. Pulses: Normal pulses. Pulmonary:      Effort: Pulmonary effort is normal.      Breath sounds: Normal breath sounds. No rales.    Chest:      Chest wall: No

## 2023-06-22 NOTE — PATIENT INSTRUCTIONS
Please be informed that if you contact our office outside of normal business hours the physician on call cannot help with any scheduling or rescheduling issues, procedure instruction questions or any type of medication issue. We advise you for any urgent/emergency that you go to the nearest emergency room! PLEASE CALL OUR OFFICE DURING NORMAL BUSINESS HOURS    Monday - Friday   8 am to 5 pm    [place] [address]: [phone]    [place]:  [phone]    **It is YOUR responsibilty to bring medication bottles and/or updated medication list to [address] [place] [address]. This will allow us to better serve you and all your healthcare needs**    Thank you for allowing us to care for you today! We want to ensure we can follow your treatment plan and we strive to give you the best outcomes and experience possible. If you ever have a life threatening emergency and call 911 - for an ambulance (EMS)   Our providers can only care for you at:   [facility] or [facility]. Even if you have someone take you or you drive yourself we can only care for you in a [facility] facility. Our providers are not setup at the other healthcare locations!

## 2023-07-23 PROCEDURE — 93294 REM INTERROG EVL PM/LDLS PM: CPT | Performed by: INTERNAL MEDICINE

## 2023-07-23 PROCEDURE — 93296 REM INTERROG EVL PM/IDS: CPT | Performed by: INTERNAL MEDICINE

## 2023-07-24 ENCOUNTER — PROCEDURE VISIT (OUTPATIENT)
Dept: CARDIOLOGY CLINIC | Age: 78
End: 2023-07-24
Payer: MEDICARE

## 2023-07-24 DIAGNOSIS — I44.0 AV BLOCK, 1ST DEGREE: ICD-10-CM

## 2023-07-24 DIAGNOSIS — I49.5 SINUS NODE DYSFUNCTION (HCC): ICD-10-CM

## 2023-07-24 DIAGNOSIS — Z95.0 CARDIAC PACEMAKER IN SITU: Primary | ICD-10-CM

## 2023-08-30 ENCOUNTER — OFFICE VISIT (OUTPATIENT)
Dept: CARDIOLOGY CLINIC | Age: 78
End: 2023-08-30
Payer: MEDICARE

## 2023-08-30 VITALS
BODY MASS INDEX: 26.33 KG/M2 | DIASTOLIC BLOOD PRESSURE: 76 MMHG | HEART RATE: 63 BPM | WEIGHT: 158 LBS | SYSTOLIC BLOOD PRESSURE: 112 MMHG | HEIGHT: 65 IN

## 2023-08-30 DIAGNOSIS — Z95.0 S/P PLACEMENT OF CARDIAC PACEMAKER: ICD-10-CM

## 2023-08-30 DIAGNOSIS — I48.0 PAF (PAROXYSMAL ATRIAL FIBRILLATION) (HCC): Primary | ICD-10-CM

## 2023-08-30 DIAGNOSIS — I48.0 HYPERCOAGULABLE STATE DUE TO PAROXYSMAL ATRIAL FIBRILLATION (HCC): ICD-10-CM

## 2023-08-30 DIAGNOSIS — D68.69 HYPERCOAGULABLE STATE DUE TO PAROXYSMAL ATRIAL FIBRILLATION (HCC): ICD-10-CM

## 2023-08-30 PROCEDURE — 99214 OFFICE O/P EST MOD 30 MIN: CPT | Performed by: NURSE PRACTITIONER

## 2023-08-30 PROCEDURE — 1123F ACP DISCUSS/DSCN MKR DOCD: CPT | Performed by: NURSE PRACTITIONER

## 2023-08-30 PROCEDURE — 93288 INTERROG EVL PM/LDLS PM IP: CPT | Performed by: NURSE PRACTITIONER

## 2023-08-30 NOTE — PATIENT INSTRUCTIONS
Please be informed that if you contact our office outside of normal business hours the physician on call cannot help with any scheduling or rescheduling issues, procedure instruction questions or any type of medication issue. We advise you for any urgent/emergency that you go to the nearest emergency room! PLEASE CALL OUR OFFICE DURING NORMAL BUSINESS HOURS    Monday - Friday   8 am to 5 pm    [place]: [address] [place]: [phone]    [place]:  [phone]  **It is YOUR responsibilty to bring medication bottles and/or updated medication list to [address] [place] [address]. This will allow us to better serve you and all your healthcare needs**  Thank you for allowing us to care for you today! We want to ensure we can follow your treatment plan and we strive to give you the best outcomes and experience possible. If you ever have a life threatening emergency and call 911 - for an ambulance (EMS)   Our providers can only care for you at:   [facility] or [facility]. Even if you have someone take you or you drive yourself we can only care for you in a [facility] facility. Our providers are not setup at the other healthcare locations!

## 2023-08-30 NOTE — PROGRESS NOTES
Atrial Fibrillation CHADSVASC2 Score Stroke Risk:   66 y.o. > 76 - 2    female Female - 1   CHF HX: No - 0   HTN HX: No - 0   Stroke/TIA/Thromboembolism No - 0   Vascular Disease HX: No - 0   Diabetes Mellitus No - 0   CHADSVASC 2 Score 3      Annual Stroke Risk 3.2% - moderate-high

## 2023-08-30 NOTE — PROGRESS NOTES
Electrophysiology FU Note      Reason for initial consultation:  Palpitations , need for pacemaker    Chief complaint : 6 month follow up fibrillation    Referring physician: Shawnee Leyva      Primary care physician: Nick Ayoub MD      History of Present Illness: This visit 8/30/2023  Patient is here today for follow up on atrial fibrillation. She states she had an episode of palpitations and heart pounding in her chest a few weeks ago. She states these symptoms woke her up from sleep. She states that they symptoms resolved on own. She denies aggravating of alleviating factors. She denies chest pain, shortness of breath, lightheadedness, dizziness, edema, or syncope. Previous visit 2/15/2023  Patient here today for follow-up on atrial fibrillation. Patient reports she feels well. She denies chest pain, palpitations, shortness of breath, lightheadedness, dizziness, edema or syncope. Patient reports she recently saw her neurologist and she needs to have an echo and a carotid ultrasound. Previous visit 8/11/2022  Patient here today for 6-month follow-up on atrial fibrillation. She reports she is felt well. She denies chest pain, palpitations, shortness of breath, lightheadedness, dizziness, edema or syncope. He denies issues with bleeding. She is taking her medications as prescribed. Previous visit (2/10/2022)      Chief Complaint   Patient presents with    6 Month Follow-Up     pt denies any cardiac symptoms. pt drinks 1 cup of coffee daily. pt does not drink or smoke. pt states decreased excercise           Previous visit:(7/31/2020)      Chief Complaint   Patient presents with    6 Month Follow-Up     patient states is feeling good. Did report a few episodes last month of feeling lightheaded and became hot and mildly sweaty. Denies CP, SOB. palpitations. No edema. Hx pacemaker, PAF.     Atrial Fibrillation           Previous visit:

## 2023-10-30 ENCOUNTER — TELEPHONE (OUTPATIENT)
Dept: CARDIOLOGY CLINIC | Age: 78
End: 2023-10-30

## 2023-10-30 ENCOUNTER — PROCEDURE VISIT (OUTPATIENT)
Dept: CARDIOLOGY CLINIC | Age: 78
End: 2023-10-30

## 2023-10-30 DIAGNOSIS — I49.5 SINUS NODE DYSFUNCTION (HCC): ICD-10-CM

## 2023-10-30 DIAGNOSIS — Z95.0 CARDIAC PACEMAKER IN SITU: Primary | ICD-10-CM

## 2023-10-30 DIAGNOSIS — I44.0 AV BLOCK, 1ST DEGREE: ICD-10-CM

## 2023-10-31 ENCOUNTER — OFFICE VISIT (OUTPATIENT)
Dept: CARDIOLOGY CLINIC | Age: 78
End: 2023-10-31
Payer: MEDICARE

## 2023-10-31 VITALS
SYSTOLIC BLOOD PRESSURE: 132 MMHG | HEIGHT: 65 IN | WEIGHT: 161 LBS | OXYGEN SATURATION: 100 % | BODY MASS INDEX: 26.82 KG/M2 | DIASTOLIC BLOOD PRESSURE: 74 MMHG | HEART RATE: 70 BPM

## 2023-10-31 DIAGNOSIS — Z95.0 CARDIAC PACEMAKER IN SITU: ICD-10-CM

## 2023-10-31 DIAGNOSIS — I48.0 PAF (PAROXYSMAL ATRIAL FIBRILLATION) (HCC): Primary | ICD-10-CM

## 2023-10-31 DIAGNOSIS — D68.69 HYPERCOAGULABLE STATE DUE TO PAROXYSMAL ATRIAL FIBRILLATION (HCC): ICD-10-CM

## 2023-10-31 DIAGNOSIS — I49.5 SINUS NODE DYSFUNCTION (HCC): ICD-10-CM

## 2023-10-31 DIAGNOSIS — I48.0 HYPERCOAGULABLE STATE DUE TO PAROXYSMAL ATRIAL FIBRILLATION (HCC): ICD-10-CM

## 2023-10-31 PROCEDURE — 99214 OFFICE O/P EST MOD 30 MIN: CPT | Performed by: NURSE PRACTITIONER

## 2023-10-31 PROCEDURE — 1123F ACP DISCUSS/DSCN MKR DOCD: CPT | Performed by: NURSE PRACTITIONER

## 2023-10-31 PROCEDURE — 93000 ELECTROCARDIOGRAM COMPLETE: CPT | Performed by: NURSE PRACTITIONER

## 2023-10-31 RX ORDER — DILTIAZEM HYDROCHLORIDE 120 MG/1
CAPSULE, EXTENDED RELEASE ORAL
COMMUNITY
Start: 2023-10-04 | End: 2023-10-31

## 2023-10-31 NOTE — PATIENT INSTRUCTIONS
Please be informed that if you contact our office outside of normal business hours the physician on call cannot help with any scheduling or rescheduling issues, procedure instruction questions or any type of medication issue. We advise you for any urgent/emergency that you go to the nearest emergency room! PLEASE CALL OUR OFFICE DURING NORMAL BUSINESS HOURS    Monday - Friday   8 am to 5 pm    [place]: [address] [place]: [phone]    [place]:  [phone]    **It is YOUR responsibilty to bring medication bottles and/or updated medication list to [facility]. This will allow us to better serve you and all your healthcare needs**    Thank you for allowing us to care for you today! We want to ensure we can follow your treatment plan and we strive to give you the best outcomes and experience possible. If you ever have a life threatening emergency and call 911 - for an ambulance (EMS)   Our providers can only care for you at:   [facility] or [facility]. Even if you have someone take you or you drive yourself we can only care for you in a [facility] facility. Our providers are not setup at the other healthcare locations! We are committed to providing you the best care possible. If you receive a survey after visiting one of our offices, please take time to share your experience concerning your physician office visit. These surveys are confidential and no health information about you is shared. We are eager to improve for you and we are counting on your feedback to help make that happen.

## 2023-10-31 NOTE — PROGRESS NOTES
Electrophysiology FU Note      Reason for initial consultation:  Palpitations , need for pacemaker    Chief complaint :  follow up fibrillation    Referring physician: Cr Pena      Primary care physician: Bandar Iglesias MD      History of Present Illness: This visit 10/31/2023  Patient is here for follow up on atrial fibrillation. She reports she is feeling well. She denies chest pain,  palpitations, shortness of breath, edema, dizziness, or syncope. She stats she has not had any further episodes of palpitations. Previous visit 8/30/2023  Patient is here today for follow up on atrial fibrillation. She states she had an episode of palpitations and heart pounding in her chest a few weeks ago. She states these symptoms woke her up from sleep. She states that they symptoms resolved on own. She denies aggravating of alleviating factors. She denies chest pain, shortness of breath, lightheadedness, dizziness, edema, or syncope. Previous visit 2/15/2023  Patient here today for follow-up on atrial fibrillation. Patient reports she feels well. She denies chest pain, palpitations, shortness of breath, lightheadedness, dizziness, edema or syncope. Patient reports she recently saw her neurologist and she needs to have an echo and a carotid ultrasound. Previous visit 8/11/2022  Patient here today for 6-month follow-up on atrial fibrillation. She reports she is felt well. She denies chest pain, palpitations, shortness of breath, lightheadedness, dizziness, edema or syncope. He denies issues with bleeding. She is taking her medications as prescribed. Previous visit (2/10/2022)      Chief Complaint   Patient presents with    6 Month Follow-Up     pt denies any cardiac symptoms. pt drinks 1 cup of coffee daily. pt does not drink or smoke.  pt states decreased excercise           Previous visit:(7/31/2020)      Chief Complaint   Patient presents with    6

## 2023-11-15 ENCOUNTER — TELEPHONE (OUTPATIENT)
Dept: CARDIOLOGY CLINIC | Age: 78
End: 2023-11-15

## 2023-11-15 NOTE — TELEPHONE ENCOUNTER
Medication reaction: Carbamazepine 100MGs. Symptoms: nightmares, hallucinations on first night. Second night was ok. Please advise.

## 2024-02-05 ENCOUNTER — PROCEDURE VISIT (OUTPATIENT)
Dept: CARDIOLOGY CLINIC | Age: 79
End: 2024-02-05

## 2024-02-05 DIAGNOSIS — I44.0 AV BLOCK, 1ST DEGREE: ICD-10-CM

## 2024-02-05 DIAGNOSIS — Z95.0 CARDIAC PACEMAKER IN SITU: Primary | ICD-10-CM

## 2024-02-05 DIAGNOSIS — I49.5 SINUS NODE DYSFUNCTION (HCC): ICD-10-CM

## 2024-03-25 ENCOUNTER — OFFICE VISIT (OUTPATIENT)
Dept: CARDIOLOGY CLINIC | Age: 79
End: 2024-03-25
Payer: MEDICARE

## 2024-03-25 VITALS
OXYGEN SATURATION: 94 % | DIASTOLIC BLOOD PRESSURE: 82 MMHG | WEIGHT: 163 LBS | HEIGHT: 65 IN | HEART RATE: 94 BPM | BODY MASS INDEX: 27.16 KG/M2 | SYSTOLIC BLOOD PRESSURE: 128 MMHG

## 2024-03-25 DIAGNOSIS — I38 VALVULAR HEART DISEASE: Primary | ICD-10-CM

## 2024-03-25 PROCEDURE — 1123F ACP DISCUSS/DSCN MKR DOCD: CPT | Performed by: INTERNAL MEDICINE

## 2024-03-25 PROCEDURE — G8484 FLU IMMUNIZE NO ADMIN: HCPCS | Performed by: INTERNAL MEDICINE

## 2024-03-25 PROCEDURE — 1036F TOBACCO NON-USER: CPT | Performed by: INTERNAL MEDICINE

## 2024-03-25 PROCEDURE — 1090F PRES/ABSN URINE INCON ASSESS: CPT | Performed by: INTERNAL MEDICINE

## 2024-03-25 PROCEDURE — 99214 OFFICE O/P EST MOD 30 MIN: CPT | Performed by: INTERNAL MEDICINE

## 2024-03-25 PROCEDURE — G8427 DOCREV CUR MEDS BY ELIG CLIN: HCPCS | Performed by: INTERNAL MEDICINE

## 2024-03-25 PROCEDURE — G8417 CALC BMI ABV UP PARAM F/U: HCPCS | Performed by: INTERNAL MEDICINE

## 2024-03-25 PROCEDURE — G8400 PT W/DXA NO RESULTS DOC: HCPCS | Performed by: INTERNAL MEDICINE

## 2024-03-25 NOTE — PATIENT INSTRUCTIONS
We are committed to providing you the best care possible.    If you receive a survey after visiting one of our offices, please take time to share your experience concerning your physician office visit.  These surveys are confidential and no health information about you is shared.    We are eager to improve for you and we are counting on your feedback to help make that happen.      **It is YOUR responsibilty to bring medication bottles and/or updated medication list to EACH APPOINTMENT. This will allow us to better serve you and all your healthcare needs**  Thank you for allowing us to care for you today!   We want to ensure we can follow your treatment plan and we strive to give you the best outcomes and experience possible.   If you ever have a life threatening emergency and call 911 - for an ambulance (EMS)   Our providers can only care for you at:   [facility] or [facility].   Even if you have someone take you or you drive yourself we can only care for you in a [facility] facility. Our providers are not setup at the other healthcare locations!   Please be informed that if you contact our office outside of normal business hours the physician on call cannot help with any scheduling or rescheduling issues, procedure instruction questions or any type of medication issue.    We advise you for any urgent/emergency that you go to the nearest emergency room!    PLEASE CALL OUR OFFICE DURING NORMAL BUSINESS HOURS    Monday - Friday   8 am to 5 pm    [place]: [phone]    [place]: [phone]    [place]:  [phone]

## 2024-03-25 NOTE — PROGRESS NOTES
CARDIOLOGY NOTE      3/25/2024    RE: Carmela Grove  (1945)                               TO:  Barrie Elise MD            CHIEF COMPLAINT   Carmela is a 78 y.o. female who was seen today for management of arrhythmias                                    HPI:                   Pt has h/o arrhythmias/PAF/hyperlipidemia, seen today for follow-up. Pt has no cardiac complaints    Carmela Grove has the following history recorded in care path:  Patient Active Problem List    Diagnosis Date Noted    Irregular heart rate 10/25/2016    Postoperative hematoma of breast 07/03/2016    PAF (paroxysmal atrial fibrillation) (HCC)     Tachycardia     Hyperlipidemia     History of atrial fibrillation     Arrhythmia     Hypercoagulable state due to paroxysmal atrial fibrillation (HCC) 08/30/2023    Tachycardia-bradycardia syndrome (HCC) 02/07/2021    S/P placement of cardiac pacemaker 05/30/2019    Palpitations     Benign liver cyst 11/06/2018    Mediastinal lymphadenopathy 11/06/2018    Hilar adenopathy 11/06/2018    Cancer of left nipple (HCC) 11/06/2018    Eosinophilia 11/06/2018     Current Outpatient Medications   Medication Sig Dispense Refill    Zolpidem Tartrate (AMBIEN PO) Take by mouth      MAGNESIUM CITRATE PO Take 250 mg by mouth 2 times daily      donepezil (ARICEPT) 5 MG tablet Take 1 tablet by mouth nightly      Cyanocobalamin (VITAMIN B-12 PO) Take 2,500 mcg by mouth      diltiazem (CARDIZEM LA) 120 MG TB24 extended release tablet Take 1 capsule by mouth daily 90 capsule 3    montelukast (SINGULAIR) 10 MG tablet Take 1 tablet by mouth nightly      Calcium Carbonate-Vitamin D (CALCIUM 500/D PO) Take 500 mg by mouth 2 times daily      aspirin 325 MG tablet Take 1 tablet by mouth daily      propafenone (RYTHMOL SR) 225 MG extended release capsule Take 1 capsule by mouth 2 times daily 180 capsule 3    atorvastatin (LIPITOR) 40 MG tablet Take 1 tablet by mouth daily       No current

## 2024-04-30 ENCOUNTER — OFFICE VISIT (OUTPATIENT)
Dept: CARDIOLOGY CLINIC | Age: 79
End: 2024-04-30
Payer: MEDICARE

## 2024-04-30 VITALS
SYSTOLIC BLOOD PRESSURE: 136 MMHG | BODY MASS INDEX: 27.19 KG/M2 | WEIGHT: 163.2 LBS | HEIGHT: 65 IN | DIASTOLIC BLOOD PRESSURE: 80 MMHG | HEART RATE: 68 BPM

## 2024-04-30 DIAGNOSIS — I48.0 PAF (PAROXYSMAL ATRIAL FIBRILLATION) (HCC): Primary | ICD-10-CM

## 2024-04-30 DIAGNOSIS — D68.69 HYPERCOAGULABLE STATE DUE TO PAROXYSMAL ATRIAL FIBRILLATION (HCC): ICD-10-CM

## 2024-04-30 DIAGNOSIS — I48.0 HYPERCOAGULABLE STATE DUE TO PAROXYSMAL ATRIAL FIBRILLATION (HCC): ICD-10-CM

## 2024-04-30 DIAGNOSIS — Z95.0 S/P PLACEMENT OF CARDIAC PACEMAKER: ICD-10-CM

## 2024-04-30 PROCEDURE — G8427 DOCREV CUR MEDS BY ELIG CLIN: HCPCS | Performed by: NURSE PRACTITIONER

## 2024-04-30 PROCEDURE — 1123F ACP DISCUSS/DSCN MKR DOCD: CPT | Performed by: NURSE PRACTITIONER

## 2024-04-30 PROCEDURE — G8417 CALC BMI ABV UP PARAM F/U: HCPCS | Performed by: NURSE PRACTITIONER

## 2024-04-30 PROCEDURE — 1090F PRES/ABSN URINE INCON ASSESS: CPT | Performed by: NURSE PRACTITIONER

## 2024-04-30 PROCEDURE — 93288 INTERROG EVL PM/LDLS PM IP: CPT | Performed by: NURSE PRACTITIONER

## 2024-04-30 PROCEDURE — 99214 OFFICE O/P EST MOD 30 MIN: CPT | Performed by: NURSE PRACTITIONER

## 2024-04-30 PROCEDURE — 1036F TOBACCO NON-USER: CPT | Performed by: NURSE PRACTITIONER

## 2024-04-30 PROCEDURE — G8400 PT W/DXA NO RESULTS DOC: HCPCS | Performed by: NURSE PRACTITIONER

## 2024-04-30 RX ORDER — DILTIAZEM HYDROCHLORIDE 240 MG/1
240 CAPSULE, COATED, EXTENDED RELEASE ORAL DAILY
Qty: 90 CAPSULE | Refills: 3 | Status: SHIPPED | OUTPATIENT
Start: 2024-04-30

## 2024-04-30 NOTE — PROGRESS NOTES
Component Value Date    AST 25 09/07/2020    ALT 12 09/07/2020    PROT 7.1 09/07/2020    BILITOT 0.5 09/07/2020    ALKPHOS 120 09/07/2020     TSH:    Lab Results   Component Value Date/Time    TSH 2.7 11/05/2015 12:00 AM       EKGINTERPRETATION - EKG Interpretation:  atrial paced    IMPRESSION / RECOMMENDATIONS:     Paroxysmal atrial fibrillation  Atrial flutter paroxysmal  Hypercoagulable due to PAF  Tachy zeb / sick sinus sp pacemaker  PAF  Trifascicular block  HLD    EKG obtained and reviewed patient noted to be atrial paced.  Device interrogated- patient continues to have PAF episodes.   She is asymptomatic     we will continue Cardizem 120 mg daily and Rythmol  mg twice daily    Patient's chads vasc score is a 3.  Patient previously did not want to take anticoagulation  Patient at this time agrees to starting eliquis 5 mg BID  She does not want to have a stroke    We will continue magnesium 250 mg daily    Patient to follow up in 6 months or sooner if needed.     Patient had recent BMP at [facility] reviewed.     Device Assessment:    The device is Medtronic pacemaker - Dual Chamber chamber      MRI Compatible : yes    Device interrogation was performed.    Mode: AAIR --- DDDR     Sensing is normal. Impedence is normal.  Threshold is normal.     There has not been interval changes.     Estimated battery life is 9.5 years     The underlying rhythm is AP,VS.  31.5 % atrial paced; 0.3 % ventricular paced.      Atrial Arrhythmia : 2.6% burden    Non sustained VT episodes : No    Sustained VT episodes : No    Patient activity reported 3.9 hrs/day    The patient is occasionally atrial pacemaker dependent.

## 2024-05-13 ENCOUNTER — PROCEDURE VISIT (OUTPATIENT)
Dept: CARDIOLOGY CLINIC | Age: 79
End: 2024-05-13

## 2024-05-13 DIAGNOSIS — I49.5 SINUS NODE DYSFUNCTION (HCC): ICD-10-CM

## 2024-05-13 DIAGNOSIS — I44.0 AV BLOCK, 1ST DEGREE: ICD-10-CM

## 2024-05-13 DIAGNOSIS — Z95.0 CARDIAC PACEMAKER IN SITU: Primary | ICD-10-CM

## 2024-07-29 ENCOUNTER — APPOINTMENT (OUTPATIENT)
Dept: GENERAL RADIOLOGY | Age: 79
End: 2024-07-29
Payer: MEDICARE

## 2024-07-29 ENCOUNTER — HOSPITAL ENCOUNTER (EMERGENCY)
Age: 79
Discharge: HOME | End: 2024-07-29
Attending: STUDENT IN AN ORGANIZED HEALTH CARE EDUCATION/TRAINING PROGRAM
Payer: MEDICARE

## 2024-07-29 ENCOUNTER — APPOINTMENT (OUTPATIENT)
Dept: ULTRASOUND IMAGING | Age: 79
End: 2024-07-29
Payer: MEDICARE

## 2024-07-29 VITALS
WEIGHT: 164 LBS | BODY MASS INDEX: 27.32 KG/M2 | SYSTOLIC BLOOD PRESSURE: 117 MMHG | TEMPERATURE: 98 F | RESPIRATION RATE: 28 BRPM | HEART RATE: 90 BPM | DIASTOLIC BLOOD PRESSURE: 76 MMHG | HEIGHT: 65 IN | OXYGEN SATURATION: 94 %

## 2024-07-29 DIAGNOSIS — I50.9 ACUTE CONGESTIVE HEART FAILURE, UNSPECIFIED HEART FAILURE TYPE (HCC): Primary | ICD-10-CM

## 2024-07-29 LAB
ALBUMIN SERPL-MCNC: 4.3 GM/DL (ref 3.4–5)
ALP BLD-CCNC: 86 IU/L (ref 40–128)
ALT SERPL-CCNC: 13 U/L (ref 10–40)
ANION GAP SERPL CALCULATED.3IONS-SCNC: 13 MMOL/L (ref 7–16)
AST SERPL-CCNC: 16 IU/L (ref 15–37)
BASOPHILS ABSOLUTE: 0 K/CU MM
BASOPHILS RELATIVE PERCENT: 0.5 % (ref 0–1)
BILIRUB SERPL-MCNC: 0.6 MG/DL (ref 0–1)
BUN SERPL-MCNC: 24 MG/DL (ref 6–23)
CALCIUM SERPL-MCNC: 9.2 MG/DL (ref 8.3–10.6)
CHLORIDE BLD-SCNC: 103 MMOL/L (ref 99–110)
CO2: 22 MMOL/L (ref 21–32)
CREAT SERPL-MCNC: 0.7 MG/DL (ref 0.6–1.1)
D-DIMER QUANTITATIVE: 1.57 UG/ML (FEU)
DIFFERENTIAL TYPE: ABNORMAL
EOSINOPHILS ABSOLUTE: 0.1 K/CU MM
EOSINOPHILS RELATIVE PERCENT: 1.4 % (ref 0–3)
GFR, ESTIMATED: 88 ML/MIN/1.73M2
GLUCOSE SERPL-MCNC: 102 MG/DL (ref 70–99)
HCT VFR BLD CALC: 41.2 % (ref 37–47)
HEMOGLOBIN: 13.6 GM/DL (ref 12.5–16)
IMMATURE NEUTROPHIL %: 0.4 % (ref 0–0.43)
LYMPHOCYTES ABSOLUTE: 0.9 K/CU MM
LYMPHOCYTES RELATIVE PERCENT: 15.5 % (ref 24–44)
MCH RBC QN AUTO: 31 PG (ref 27–31)
MCHC RBC AUTO-ENTMCNC: 33 % (ref 32–36)
MCV RBC AUTO: 93.8 FL (ref 78–100)
MONOCYTES ABSOLUTE: 0.6 K/CU MM
MONOCYTES RELATIVE PERCENT: 10.6 % (ref 0–4)
NEUTROPHILS ABSOLUTE: 4.1 K/CU MM
NEUTROPHILS RELATIVE PERCENT: 71.6 % (ref 36–66)
NUCLEATED RBC %: 0 %
PDW BLD-RTO: 13.9 % (ref 11.7–14.9)
PLATELET # BLD: 194 K/CU MM (ref 140–440)
PMV BLD AUTO: 9.9 FL (ref 7.5–11.1)
POTASSIUM SERPL-SCNC: 3.8 MMOL/L (ref 3.5–5.1)
PRO-BNP: 1991 PG/ML
RBC # BLD: 4.39 M/CU MM (ref 4.2–5.4)
SODIUM BLD-SCNC: 138 MMOL/L (ref 135–145)
TOTAL IMMATURE NEUTOROPHIL: 0.02 K/CU MM
TOTAL NUCLEATED RBC: 0 K/CU MM
TOTAL PROTEIN: 7.3 GM/DL (ref 6.4–8.2)
TROPONIN, HIGH SENSITIVITY: 12 NG/L (ref 0–14)
TROPONIN, HIGH SENSITIVITY: 12 NG/L (ref 0–14)
WBC # BLD: 5.7 K/CU MM (ref 4–10.5)

## 2024-07-29 PROCEDURE — 93970 EXTREMITY STUDY: CPT

## 2024-07-29 PROCEDURE — 80053 COMPREHEN METABOLIC PANEL: CPT

## 2024-07-29 PROCEDURE — 6360000002 HC RX W HCPCS: Performed by: STUDENT IN AN ORGANIZED HEALTH CARE EDUCATION/TRAINING PROGRAM

## 2024-07-29 PROCEDURE — 96374 THER/PROPH/DIAG INJ IV PUSH: CPT

## 2024-07-29 PROCEDURE — 84484 ASSAY OF TROPONIN QUANT: CPT

## 2024-07-29 PROCEDURE — 99285 EMERGENCY DEPT VISIT HI MDM: CPT

## 2024-07-29 PROCEDURE — 93005 ELECTROCARDIOGRAM TRACING: CPT | Performed by: STUDENT IN AN ORGANIZED HEALTH CARE EDUCATION/TRAINING PROGRAM

## 2024-07-29 PROCEDURE — 71046 X-RAY EXAM CHEST 2 VIEWS: CPT

## 2024-07-29 PROCEDURE — 85025 COMPLETE CBC W/AUTO DIFF WBC: CPT

## 2024-07-29 PROCEDURE — 85379 FIBRIN DEGRADATION QUANT: CPT

## 2024-07-29 PROCEDURE — 83880 ASSAY OF NATRIURETIC PEPTIDE: CPT

## 2024-07-29 RX ORDER — FUROSEMIDE 20 MG/1
20 TABLET ORAL DAILY
Qty: 5 TABLET | Refills: 0 | Status: SHIPPED | OUTPATIENT
Start: 2024-07-29 | End: 2024-08-03

## 2024-07-29 RX ORDER — FUROSEMIDE 10 MG/ML
20 INJECTION INTRAMUSCULAR; INTRAVENOUS ONCE
Status: COMPLETED | OUTPATIENT
Start: 2024-07-29 | End: 2024-07-29

## 2024-07-29 RX ORDER — POTASSIUM CHLORIDE 20 MEQ/1
20 TABLET, EXTENDED RELEASE ORAL DAILY
Qty: 7 TABLET | Refills: 0 | Status: SHIPPED | OUTPATIENT
Start: 2024-07-29 | End: 2024-08-05

## 2024-07-29 RX ADMIN — FUROSEMIDE 20 MG: 10 INJECTION, SOLUTION INTRAMUSCULAR; INTRAVENOUS at 13:59

## 2024-07-29 ASSESSMENT — ENCOUNTER SYMPTOMS
VOMITING: 0
SHORTNESS OF BREATH: 0
FACIAL SWELLING: 1
SORE THROAT: 0
NAUSEA: 0
ABDOMINAL PAIN: 0
COUGH: 0

## 2024-07-29 ASSESSMENT — PAIN SCALES - GENERAL: PAINLEVEL_OUTOF10: 0

## 2024-07-29 ASSESSMENT — PAIN - FUNCTIONAL ASSESSMENT: PAIN_FUNCTIONAL_ASSESSMENT: 0-10

## 2024-07-29 NOTE — DISCHARGE INSTRUCTIONS
You are seen here today for lower extremity swelling.  I believe this is due to in part due to dependent edema but I am also concern for a component of heart failure.  I am prescribing you Lasix which is a diuretic and will increase your urine output.  I am also prescribing potassium to help prevent low potassium caused by Lasix.  Please take the medication as prescribed.  Please call your cardiologist today to schedule follow-up as I believe you will require a repeat cardiac echo to check how your heart is functioning.  I also recommend wearing compression stockings and keep your legs elevated above your heart at night.  Return the emergency department for any new worsening concerning complaints

## 2024-07-29 NOTE — ED PROVIDER NOTES
[facility] EMERGENCY DEPARTMENT  Emergency Department Encounter    Pt Name:Carmela Grove  MRN: [id number]  Birthdate 1945  Date of evaluation: 24  PCP:  Barrie Lo MD       CHIEF COMPLAINT       Chief Complaint   Patient presents with    Leg Swelling     Right leg       HISTORY OF PRESENT ILLNESS     Carmela Grove is a 79 y.o. female who presents with bilateral lower extremity swelling and facial swelling.    Patient returned from a long greater than 8-hour train ride on Thursday.  Since that time she has noticed bilateral lower extremity edema worse in the right leg.  She kept her legs propped up at night and it has improved but seems to have come back.  She denies any calf tenderness.  Today when she went to work she had some facial swelling and her coworkers told her to come to the emergency department for evaluation.  She denies any rash, shortness of breath, new medications or known allergies.    Patient denies any chest pain no previous history of DVT or PE.    Patient does have history of A-fib on Eliquis reports compliance, hypertension, hyperlipidemia, cognitive decline.    PAST MEDICAL / SURGICAL / SOCIAL / FAMILY HISTORY      has a past medical history of Arrhythmia, Atrial fibrillation (HCC), Cancer (HCC), Constipation, H/O 24 hour EKG monitoring, H/O echocardiogram, H/O exercise stress test, History of atrial fibrillation, History of cardiovascular stress test, History of exercise stress test, History of Holter monitoring, History of LIMITED echocardiogram, Hx of cardiovascular stress test, Hyperlipidemia, Palpitations, Postoperative hematoma of breast, Tachycardia, and Wears glasses.     has a past surgical history that includes  section; knee surgery; shoulder surgery; Hysterectomy, vaginal; pre-malignant / benign skin lesion excision; Breast surgery; other surgical history (Left, 2016); Cardiac pacemaker placement (Left,

## 2024-07-29 NOTE — ED NOTES
Spoke with lab regarding troponin result.  Mauro from lab states that he needs to print off a paper, take it to chemistry and they will run it.

## 2024-07-29 NOTE — ED TRIAGE NOTES
Patient presents to ED by EMS with complaint of right lower extremity and facial swelling, some shortness of breath and ongoing dizziness.  Possible EKG changes per EMS.

## 2024-07-30 ENCOUNTER — OFFICE VISIT (OUTPATIENT)
Dept: CARDIOLOGY CLINIC | Age: 79
End: 2024-07-30
Payer: MEDICARE

## 2024-07-30 VITALS
HEART RATE: 105 BPM | BODY MASS INDEX: 27.79 KG/M2 | OXYGEN SATURATION: 98 % | DIASTOLIC BLOOD PRESSURE: 80 MMHG | RESPIRATION RATE: 20 BRPM | WEIGHT: 167 LBS | SYSTOLIC BLOOD PRESSURE: 114 MMHG

## 2024-07-30 DIAGNOSIS — R06.02 SHORT OF BREATH ON EXERTION: ICD-10-CM

## 2024-07-30 DIAGNOSIS — I48.0 PAF (PAROXYSMAL ATRIAL FIBRILLATION) (HCC): Primary | ICD-10-CM

## 2024-07-30 DIAGNOSIS — I50.31 ACUTE HEART FAILURE WITH PRESERVED EJECTION FRACTION (HCC): ICD-10-CM

## 2024-07-30 DIAGNOSIS — R06.02 SHORTNESS OF BREATH: ICD-10-CM

## 2024-07-30 PROCEDURE — G8417 CALC BMI ABV UP PARAM F/U: HCPCS | Performed by: NURSE PRACTITIONER

## 2024-07-30 PROCEDURE — 1090F PRES/ABSN URINE INCON ASSESS: CPT | Performed by: NURSE PRACTITIONER

## 2024-07-30 PROCEDURE — 99214 OFFICE O/P EST MOD 30 MIN: CPT | Performed by: NURSE PRACTITIONER

## 2024-07-30 PROCEDURE — G8400 PT W/DXA NO RESULTS DOC: HCPCS | Performed by: NURSE PRACTITIONER

## 2024-07-30 PROCEDURE — 1036F TOBACCO NON-USER: CPT | Performed by: NURSE PRACTITIONER

## 2024-07-30 PROCEDURE — G8427 DOCREV CUR MEDS BY ELIG CLIN: HCPCS | Performed by: NURSE PRACTITIONER

## 2024-07-30 PROCEDURE — 1123F ACP DISCUSS/DSCN MKR DOCD: CPT | Performed by: NURSE PRACTITIONER

## 2024-07-30 ASSESSMENT — ENCOUNTER SYMPTOMS
ORTHOPNEA: 0
BLOATING: 1
SHORTNESS OF BREATH: 0

## 2024-07-30 NOTE — PROGRESS NOTES
on exertion and peripheral edema.   Has noticed some improvement with starting a loop  Will check echocardiogram to assess her EF along with wall motion abnormalities  Also plan to check stress Cardiolite to rule out ischemia as a cause for new onset of heart failure as well.  She has a history of PAF along with pacemaker placement  Pacemaker analysis showed 5.7% atrial for burden.  She 0.9% V paced-rate appears to be well-controlled.  She is however on Rythmol which is concerning in the setting of new onset of heart failure may need to consider changing antiarrhythmics. - refer to EP   Continue short term lasix       Tests ordered:  echo lasix   Follow-up  for results      Signed:  RAJIV Nolan CNP, 7/30/2024, 10:36 AM    An electronic signature was used to authenticate this note.    Please note this report has been partially produced using speech recognition software and may contain errors related to that system including errors in grammar, punctuation, and spelling, as well as words and phrases that may be inappropriate. If there are any questions or concerns please feel free to contact the dictating provider for clarification.

## 2024-07-31 LAB
EKG ATRIAL RATE: 133 BPM
EKG DIAGNOSIS: NORMAL
EKG Q-T INTERVAL: 410 MS
EKG QRS DURATION: 70 MS
EKG QTC CALCULATION (BAZETT): 484 MS
EKG R AXIS: -47 DEGREES
EKG T AXIS: -50 DEGREES
EKG VENTRICULAR RATE: 84 BPM

## 2024-07-31 PROCEDURE — 93010 ELECTROCARDIOGRAM REPORT: CPT | Performed by: INTERNAL MEDICINE

## 2024-08-08 ENCOUNTER — TELEPHONE (OUTPATIENT)
Dept: CARDIOLOGY CLINIC | Age: 79
End: 2024-08-08

## 2024-08-08 NOTE — TELEPHONE ENCOUNTER
Called patient and advised per Ernestine this AM that we are cancelling her apt today until she is seen by cardiology and testing then patient needs to f/u with DR Marcus. Apt with Dr Marcus scheduled for 9/4/2024 @ 1430.

## 2024-08-08 NOTE — TELEPHONE ENCOUNTER
called they were at dinner last night  She was having a hard time swallowing , she   Started to cough and water was coming out of her   Nose , he is asking if this can be from her CHF  Please advise.

## 2024-08-13 ENCOUNTER — OFFICE VISIT (OUTPATIENT)
Dept: CARDIOLOGY CLINIC | Age: 79
End: 2024-08-13
Payer: MEDICARE

## 2024-08-13 ENCOUNTER — HOSPITAL ENCOUNTER (OUTPATIENT)
Age: 79
Discharge: HOME | End: 2024-08-13
Payer: MEDICARE

## 2024-08-13 ENCOUNTER — TELEPHONE (OUTPATIENT)
Dept: CARDIOLOGY CLINIC | Age: 79
End: 2024-08-13

## 2024-08-13 VITALS
WEIGHT: 162 LBS | SYSTOLIC BLOOD PRESSURE: 128 MMHG | BODY MASS INDEX: 26.99 KG/M2 | DIASTOLIC BLOOD PRESSURE: 74 MMHG | HEIGHT: 65 IN | HEART RATE: 62 BPM | OXYGEN SATURATION: 89 %

## 2024-08-13 DIAGNOSIS — I48.0 PAROXYSMAL ATRIAL FIBRILLATION (HCC): Primary | ICD-10-CM

## 2024-08-13 DIAGNOSIS — Z01.810 PRE-OPERATIVE CARDIOVASCULAR EXAMINATION: ICD-10-CM

## 2024-08-13 DIAGNOSIS — R06.02 SHORTNESS OF BREATH: Primary | ICD-10-CM

## 2024-08-13 LAB
ANION GAP SERPL CALCULATED.3IONS-SCNC: 13 MMOL/L (ref 7–16)
BUN SERPL-MCNC: 15 MG/DL (ref 6–23)
CALCIUM SERPL-MCNC: 9.8 MG/DL (ref 8.3–10.6)
CHLORIDE BLD-SCNC: 104 MMOL/L (ref 99–110)
CO2: 25 MMOL/L (ref 21–32)
CREAT SERPL-MCNC: 0.7 MG/DL (ref 0.6–1.1)
GFR, ESTIMATED: 88 ML/MIN/1.73M2
GLUCOSE SERPL-MCNC: 98 MG/DL (ref 70–99)
POTASSIUM SERPL-SCNC: 4.4 MMOL/L (ref 3.5–5.1)
SODIUM BLD-SCNC: 142 MMOL/L (ref 135–145)

## 2024-08-13 PROCEDURE — 93000 ELECTROCARDIOGRAM COMPLETE: CPT | Performed by: INTERNAL MEDICINE

## 2024-08-13 PROCEDURE — 36415 COLL VENOUS BLD VENIPUNCTURE: CPT

## 2024-08-13 PROCEDURE — 1123F ACP DISCUSS/DSCN MKR DOCD: CPT | Performed by: INTERNAL MEDICINE

## 2024-08-13 PROCEDURE — G8427 DOCREV CUR MEDS BY ELIG CLIN: HCPCS | Performed by: INTERNAL MEDICINE

## 2024-08-13 PROCEDURE — G8400 PT W/DXA NO RESULTS DOC: HCPCS | Performed by: INTERNAL MEDICINE

## 2024-08-13 PROCEDURE — G8417 CALC BMI ABV UP PARAM F/U: HCPCS | Performed by: INTERNAL MEDICINE

## 2024-08-13 PROCEDURE — 99214 OFFICE O/P EST MOD 30 MIN: CPT | Performed by: INTERNAL MEDICINE

## 2024-08-13 PROCEDURE — 1036F TOBACCO NON-USER: CPT | Performed by: INTERNAL MEDICINE

## 2024-08-13 PROCEDURE — 80048 BASIC METABOLIC PNL TOTAL CA: CPT

## 2024-08-13 PROCEDURE — 1090F PRES/ABSN URINE INCON ASSESS: CPT | Performed by: INTERNAL MEDICINE

## 2024-08-13 NOTE — PATIENT INSTRUCTIONS
Please be informed that if you contact our office outside of normal business hours the physician on call cannot help with any scheduling or rescheduling issues, procedure instruction questions or any type of medication issue.    We advise you for any urgent/emergency that you go to the nearest emergency room!    PLEASE CALL OUR OFFICE DURING NORMAL BUSINESS HOURS    Monday - Friday   8 am to 5 pm    [place]: [phone]    [place]: [phone]    [place]:  [phone]  **It is YOUR responsibilty to bring medication bottles and/or updated medication list to EACH APPOINTMENT. This will allow us to better serve you and all your healthcare needs**  Thank you for allowing us to care for you today!   We want to ensure we can follow your treatment plan and we strive to give you the best outcomes and experience possible.   If you ever have a life threatening emergency and call 911 - for an ambulance (EMS)   Our providers can only care for you at:   [facility] or [facility]   Even if you have someone take you or you drive yourself we can only care for you in a [facility] facility. Our providers are not setup at the other healthcare locations!   We are committed to providing you the best care possible.    If you receive a survey after visiting one of our offices, please take time to share your experience concerning your physician office visit.  These surveys are confidential and no health information about you is shared.    We are eager to improve for you and we are counting on your feedback to help make that happen.

## 2024-08-13 NOTE — TELEPHONE ENCOUNTER
[facility]     Dr. Karla Bach     Transesophageal Echocardiogram with Cardioversion    Patient Name: Carmela Grove   : 1945   MRN# [id number]     Date of Procedure: 24 Time: 12pm Arrival Time: 11am   (Arrival time is scheduled for one (1) hour before procedure is scheduled.)     Hospital: [facility] ([facility])     X   If you have received orders for blood work and or a chest x-ray, please have         them done on assigned date at [facility],         [facility], or [facility].    X   Please do not have anything by mouth after midnight prior to or 8 hours before         the procedure.    X   You may take your medication with a sip of water unless advised otherwise below.  the day of the procedure.     X Please continue to take Eliquis (apixaban) as directed.     X If you are taking Lasix (furosemide) please do not take it the morning before your procedure.

## 2024-08-13 NOTE — TELEPHONE ENCOUNTER
Patient given instructions over telephone on 8/13/24.  Procedure is scheduled for 8/14/24 @ 12pm, w/arrival @ 11am, @ [facility]. Medication/Education Letter gone over with patient. Procedure and risks were explained to patient. Questions answered, Patient voiced understanding.        Patient was notified that surgery date or time could be changed due to an emergency. Patient voiced understanding.

## 2024-08-13 NOTE — TELEPHONE ENCOUNTER
Test Ordered: ARYA   /  Insurance: [employer]  /  Authorization Status: Approved:  Petty# [id number], Exp 8/13/25

## 2024-08-13 NOTE — PROGRESS NOTES
CARDIOLOGY NOTE      8/13/2024    RE: Carmela Grove  (1945)                               TO:  Barrie Elise MD            CHIEF COMPLAINT   Carmela is a 79 y.o. female who was seen today for management of arrhythmias                                    HPI:                   Pt has h/o arrhythmias/PAF/hyperlipidemia, seen today for follow-up. Pt has no cardiac complaints  Has been having SOB  Had high BNP  Carmela Grove has the following history recorded in care path:  Patient Active Problem List    Diagnosis Date Noted    Irregular heart rate 10/25/2016    Postoperative hematoma of breast 07/03/2016    PAF (paroxysmal atrial fibrillation) (HCC)     Tachycardia     Hyperlipidemia     History of atrial fibrillation     Arrhythmia     Hypercoagulable state due to paroxysmal atrial fibrillation (HCC) 08/30/2023    Tachycardia-bradycardia syndrome (HCC) 02/07/2021    S/P placement of cardiac pacemaker 05/30/2019    Palpitations     Benign liver cyst 11/06/2018    Mediastinal lymphadenopathy 11/06/2018    Hilar adenopathy 11/06/2018    Cancer of left nipple (HCC) 11/06/2018    Eosinophilia 11/06/2018     Current Outpatient Medications   Medication Sig Dispense Refill    Handicap Placard MISC by Does not apply route Start 07/30/2024  End 07/30/2029 1 each 0    apixaban (ELIQUIS) 5 MG TABS tablet Take 1 tablet by mouth 2 times daily 180 tablet 3    dilTIAZem (CARDIZEM CD) 240 MG extended release capsule Take 1 capsule by mouth daily 90 capsule 3    Zolpidem Tartrate (AMBIEN PO) Take by mouth      MAGNESIUM CITRATE PO Take 250 mg by mouth 2 times daily      donepezil (ARICEPT) 5 MG tablet Take 1 tablet by mouth nightly      Cyanocobalamin (VITAMIN B-12 PO) Take 2,500 mcg by mouth      Calcium Carbonate-Vitamin D (CALCIUM 500/D PO) Take 500 mg by mouth 2 times daily      propafenone (RYTHMOL SR) 225 MG extended release capsule Take 1 capsule by mouth 2 times daily 180 capsule 3    atorvastatin (LIPITOR)

## 2024-08-14 ENCOUNTER — TELEPHONE (OUTPATIENT)
Dept: CARDIOLOGY CLINIC | Age: 79
End: 2024-08-14

## 2024-08-14 ENCOUNTER — HOSPITAL ENCOUNTER (OUTPATIENT)
Dept: NON INVASIVE DIAGNOSTICS | Age: 79
Discharge: HOME | End: 2024-08-16
Attending: INTERNAL MEDICINE
Payer: MEDICARE

## 2024-08-14 VITALS
WEIGHT: 162 LBS | DIASTOLIC BLOOD PRESSURE: 80 MMHG | OXYGEN SATURATION: 94 % | HEIGHT: 65 IN | HEART RATE: 79 BPM | RESPIRATION RATE: 19 BRPM | SYSTOLIC BLOOD PRESSURE: 140 MMHG | BODY MASS INDEX: 26.99 KG/M2

## 2024-08-14 DIAGNOSIS — I48.0 PAROXYSMAL ATRIAL FIBRILLATION (HCC): ICD-10-CM

## 2024-08-14 LAB
ECHO BSA: 1.84 M2
ECHO MV REGURGITANT ALIASING (NYQUIST) VELOCITY: 39 CM/S
ECHO MV REGURGITANT PEAK GRADIENT: 61 MMHG
ECHO MV REGURGITANT PEAK VELOCITY: 3.9 M/S
ECHO MV REGURGITANT RADIUS PISA: 0.9 CM
ECHO MV REGURGITANT VTIA: 187 CM

## 2024-08-14 PROCEDURE — 99153 MOD SED SAME PHYS/QHP EA: CPT | Performed by: INTERNAL MEDICINE

## 2024-08-14 PROCEDURE — 7100000011 HC PHASE II RECOVERY - ADDTL 15 MIN: Performed by: INTERNAL MEDICINE

## 2024-08-14 PROCEDURE — 93312 ECHO TRANSESOPHAGEAL: CPT | Performed by: INTERNAL MEDICINE

## 2024-08-14 PROCEDURE — 6360000002 HC RX W HCPCS: Performed by: INTERNAL MEDICINE

## 2024-08-14 PROCEDURE — 92960 CARDIOVERSION ELECTRIC EXT: CPT | Performed by: INTERNAL MEDICINE

## 2024-08-14 PROCEDURE — 93005 ELECTROCARDIOGRAM TRACING: CPT | Performed by: INTERNAL MEDICINE

## 2024-08-14 PROCEDURE — 7100000010 HC PHASE II RECOVERY - FIRST 15 MIN: Performed by: INTERNAL MEDICINE

## 2024-08-14 PROCEDURE — 99152 MOD SED SAME PHYS/QHP 5/>YRS: CPT | Performed by: INTERNAL MEDICINE

## 2024-08-14 PROCEDURE — 93320 DOPPLER ECHO COMPLETE: CPT | Performed by: INTERNAL MEDICINE

## 2024-08-14 PROCEDURE — 93319 3D ECHO IMG CGEN CAR ANOMAL: CPT | Performed by: INTERNAL MEDICINE

## 2024-08-14 PROCEDURE — 6370000000 HC RX 637 (ALT 250 FOR IP): Performed by: INTERNAL MEDICINE

## 2024-08-14 PROCEDURE — 92960 CARDIOVERSION ELECTRIC EXT: CPT

## 2024-08-14 RX ORDER — FENTANYL CITRATE 50 UG/ML
INJECTION, SOLUTION INTRAMUSCULAR; INTRAVENOUS PRN
Status: COMPLETED | OUTPATIENT
Start: 2024-08-14 | End: 2024-08-14

## 2024-08-14 RX ORDER — MIDAZOLAM HYDROCHLORIDE 1 MG/ML
INJECTION INTRAMUSCULAR; INTRAVENOUS PRN
Status: COMPLETED | OUTPATIENT
Start: 2024-08-14 | End: 2024-08-14

## 2024-08-14 RX ORDER — LIDOCAINE HYDROCHLORIDE 20 MG/ML
SOLUTION OROPHARYNGEAL PRN
Status: COMPLETED | OUTPATIENT
Start: 2024-08-14 | End: 2024-08-14

## 2024-08-14 RX ADMIN — MIDAZOLAM 1 MG: 1 INJECTION INTRAMUSCULAR; INTRAVENOUS at 12:24

## 2024-08-14 RX ADMIN — LIDOCAINE HYDROCHLORIDE 15 ML: 20 SOLUTION ORAL at 12:16

## 2024-08-14 RX ADMIN — MIDAZOLAM 2 MG: 1 INJECTION INTRAMUSCULAR; INTRAVENOUS at 12:19

## 2024-08-14 RX ADMIN — MIDAZOLAM 1 MG: 1 INJECTION INTRAMUSCULAR; INTRAVENOUS at 12:30

## 2024-08-14 RX ADMIN — MIDAZOLAM 2 MG: 1 INJECTION INTRAMUSCULAR; INTRAVENOUS at 12:21

## 2024-08-14 RX ADMIN — FENTANYL CITRATE 25 MCG: 50 INJECTION, SOLUTION INTRAMUSCULAR; INTRAVENOUS at 12:32

## 2024-08-14 RX ADMIN — MIDAZOLAM 1 MG: 1 INJECTION INTRAMUSCULAR; INTRAVENOUS at 12:31

## 2024-08-14 NOTE — TELEPHONE ENCOUNTER
Echo (TTE) complete (PRN contrast/bubble/strain/3D)    ----- Message from RAJIV Nolan CNP sent at 8/13/2024  5:29 PM EDT -----  Echo shows moderate MR  No further testing or treatment needed     Results given, pt voiced understanding.

## 2024-08-15 LAB
EKG ATRIAL RATE: 108 BPM
EKG ATRIAL RATE: 75 BPM
EKG DIAGNOSIS: NORMAL
EKG DIAGNOSIS: NORMAL
EKG P AXIS: 64 DEGREES
EKG P-R INTERVAL: 194 MS
EKG Q-T INTERVAL: 332 MS
EKG Q-T INTERVAL: 444 MS
EKG QRS DURATION: 136 MS
EKG QRS DURATION: 146 MS
EKG QTC CALCULATION (BAZETT): 447 MS
EKG QTC CALCULATION (BAZETT): 495 MS
EKG R AXIS: -28 DEGREES
EKG R AXIS: -34 DEGREES
EKG T AXIS: -3 DEGREES
EKG T AXIS: 229 DEGREES
EKG VENTRICULAR RATE: 109 BPM
EKG VENTRICULAR RATE: 75 BPM

## 2024-08-20 ENCOUNTER — PROCEDURE VISIT (OUTPATIENT)
Dept: CARDIOLOGY CLINIC | Age: 79
End: 2024-08-20

## 2024-08-20 DIAGNOSIS — I49.5 SINUS NODE DYSFUNCTION (HCC): ICD-10-CM

## 2024-08-20 DIAGNOSIS — I44.0 AV BLOCK, 1ST DEGREE: ICD-10-CM

## 2024-08-20 DIAGNOSIS — Z95.0 CARDIAC PACEMAKER IN SITU: Primary | ICD-10-CM

## 2024-08-22 ENCOUNTER — HOSPITAL ENCOUNTER (OUTPATIENT)
Dept: GENERAL RADIOLOGY | Age: 79
Discharge: HOME | End: 2024-08-22
Payer: MEDICARE

## 2024-08-22 ENCOUNTER — HOSPITAL ENCOUNTER (OUTPATIENT)
Dept: SPEECH THERAPY | Age: 79
Setting detail: THERAPIES SERIES
Discharge: HOME | End: 2024-08-22
Payer: MEDICARE

## 2024-08-22 ENCOUNTER — TELEPHONE (OUTPATIENT)
Dept: CARDIOLOGY CLINIC | Age: 79
End: 2024-08-22

## 2024-08-22 DIAGNOSIS — R13.14 DYSPHAGIA, PHARYNGOESOPHAGEAL PHASE: Primary | ICD-10-CM

## 2024-08-22 DIAGNOSIS — R13.14 DYSPHAGIA, PHARYNGOESOPHAGEAL PHASE: ICD-10-CM

## 2024-08-22 PROCEDURE — 92611 MOTION FLUOROSCOPY/SWALLOW: CPT

## 2024-08-22 PROCEDURE — 74230 X-RAY XM SWLNG FUNCJ C+: CPT

## 2024-08-22 ASSESSMENT — ENCOUNTER SYMPTOMS
SHORTNESS OF BREATH: 0
ORTHOPNEA: 0

## 2024-08-22 NOTE — TELEPHONE ENCOUNTER
Nuclear stress test with myocardial perfusion    ----- Message from RAJIV Nolan - CNP sent at 8/21/2024  3:09 PM EDT -----  Stress test is abn- recommend to follow up on office to further discuss     Left VM for pt to call back for results. Trying to move OV appt to today with dr cam

## 2024-08-22 NOTE — PROCEDURES
INSTRUMENTAL SWALLOW REPORT  MODIFIED BARIUM SWALLOW    NAME: Carmela Grove   : 1945  MRN: [id number]       Date of Eval: 2024     Ordering Physician: Manolo Woodall  Radiologist: Available upon request     Referring Diagnosis(es): Referring Diagnosis: Concern for oropharyngeal dysphagia    Past Medical History:  has a past medical history of Arrhythmia, Atrial fibrillation (HCC), Cancer (HCC), Constipation, H/O 24 hour EKG monitoring, H/O echocardiogram, H/O exercise stress test, History of atrial fibrillation, History of cardiovascular stress test, History of exercise stress test, History of Holter monitoring, History of LIMITED echocardiogram, Hx of cardiovascular stress test, Hyperlipidemia, Palpitations, Postoperative hematoma of breast, Tachycardia, and Wears glasses.  Past Surgical History:  has a past surgical history that includes  section; knee surgery; shoulder surgery; Hysterectomy, vaginal; pre-malignant / benign skin lesion excision; Breast surgery; other surgical history (Left, 2016); Cardiac pacemaker placement (Left, 2019); joint replacement; and knee surgery (Left, ).          Date of Prior Study: None per chart review        Recent CXR/CT of Chest: Chest xray on 24 revealed \"Minimal infiltrates noted in the lingula, and right lower lobe.\"    Patient Complaints/Reason for Referral:  Carmela Grove was referred for a MBS to assess the efficiency of his/her swallow function, assess for aspiration, and to make recommendations regarding safe dietary consistencies, effective compensatory strategies, and safe eating environment.  Patient complaints: Pt complains of difficulty swallowing solid foods and coughing with liquids following solid consumption    Onset of problem:   Date of Onset: Several months ago          Behavior/Cognition/Vision/Hearing:  Behavior/Cognition: Alert;Cooperative;Pleasant mood  Vision: Within Functional Limits  Hearing: Within functional

## 2024-08-22 NOTE — PROGRESS NOTES
Test: A Vitaliy protocol stress test was performed. Overall, the patient's exercise capacity was average for their age. The patient reached stage 2 of the protocol and was stressed for 5 min and 0 sec. Hemodynamics are adequate for diagnosis. Blood pressure demonstrated a normal response and heart rate demonstrated a normal response to stress. The patient's heart rate recovery was normal.    Nonspecific ST and T wave changes    Cardiolite study demonstrates an area of reduced tracer uptake of the apex of the left ventricle with improvement on the resting images the rest of the myocardium shows normal tracer uptake ejection fraction by gated study is 60% with normal global and regional left ventricular systolic function    Cardiolite study demonstrates apical ischemia with normal EF  HFpEF  Clinically appears to be euvolemic her shortness of breath is improved.    Valvular heart disease  Recent transesophageal echocardiogram moderate to severe TR along with moderate to severe MR with prolapse of the A2 segment of the anterior mitral valve leaflet.  She denies symptoms of shortness of breath.  Recommend medical management at this time with ongoing surveillance of routine echocardiograms    Atrial fibrillation-  Underwent ARYA/cardioversion.  She is irregularly irregular on today's visit.  Will continue with anticoagulation for stroke prophylaxis.  To follow-up with electrophysiology : on Rythmol-May need to be discontinued post heart cath if CAD identified.  Continue with diltiazem    Tests ordered: Left heart catheterization  Follow-up  for results      Signed:  RAJIV Nolan CNP, 8/23/2024, 10:31 AM    An electronic signature was used to authenticate this note.    Please note this report has been partially produced using speech recognition software and may contain errors related to that system including errors in grammar, punctuation, and spelling, as well as words and phrases that may be inappropriate. If

## 2024-08-23 ENCOUNTER — TELEPHONE (OUTPATIENT)
Dept: CARDIOLOGY CLINIC | Age: 79
End: 2024-08-23

## 2024-08-23 ENCOUNTER — OFFICE VISIT (OUTPATIENT)
Dept: CARDIOLOGY CLINIC | Age: 79
End: 2024-08-23
Payer: MEDICARE

## 2024-08-23 VITALS
WEIGHT: 159.8 LBS | HEART RATE: 58 BPM | HEIGHT: 65 IN | OXYGEN SATURATION: 99 % | DIASTOLIC BLOOD PRESSURE: 78 MMHG | BODY MASS INDEX: 26.62 KG/M2 | SYSTOLIC BLOOD PRESSURE: 118 MMHG

## 2024-08-23 DIAGNOSIS — R06.02 SHORTNESS OF BREATH: ICD-10-CM

## 2024-08-23 DIAGNOSIS — I25.10 CORONARY ARTERY DISEASE INVOLVING NATIVE CORONARY ARTERY OF NATIVE HEART WITHOUT ANGINA PECTORIS: ICD-10-CM

## 2024-08-23 DIAGNOSIS — R06.02 SOB (SHORTNESS OF BREATH): ICD-10-CM

## 2024-08-23 DIAGNOSIS — Z01.810 PRE-OPERATIVE CARDIOVASCULAR EXAMINATION: Primary | ICD-10-CM

## 2024-08-23 DIAGNOSIS — I48.0 PAF (PAROXYSMAL ATRIAL FIBRILLATION) (HCC): Primary | ICD-10-CM

## 2024-08-23 DIAGNOSIS — I38 VALVULAR HEART DISEASE: ICD-10-CM

## 2024-08-23 PROCEDURE — 1036F TOBACCO NON-USER: CPT | Performed by: NURSE PRACTITIONER

## 2024-08-23 PROCEDURE — 1123F ACP DISCUSS/DSCN MKR DOCD: CPT | Performed by: NURSE PRACTITIONER

## 2024-08-23 PROCEDURE — G8428 CUR MEDS NOT DOCUMENT: HCPCS | Performed by: NURSE PRACTITIONER

## 2024-08-23 PROCEDURE — 99214 OFFICE O/P EST MOD 30 MIN: CPT | Performed by: NURSE PRACTITIONER

## 2024-08-23 PROCEDURE — G8417 CALC BMI ABV UP PARAM F/U: HCPCS | Performed by: NURSE PRACTITIONER

## 2024-08-23 PROCEDURE — 1090F PRES/ABSN URINE INCON ASSESS: CPT | Performed by: NURSE PRACTITIONER

## 2024-08-23 PROCEDURE — G8400 PT W/DXA NO RESULTS DOC: HCPCS | Performed by: NURSE PRACTITIONER

## 2024-08-23 ASSESSMENT — ENCOUNTER SYMPTOMS
BLOATING: 0
BACK PAIN: 1

## 2024-08-23 NOTE — PATIENT INSTRUCTIONS
We are committed to providing you the best care possible.    If you receive a survey after visiting one of our offices, please take time to share your experience concerning your physician office visit.  These surveys are confidential and no health information about you is shared.    We are eager to improve for you and we are counting on your feedback to help make that happen.      **It is YOUR responsibilty to bring medication bottles and/or updated medication list to EACH APPOINTMENT. This will allow us to better serve you and all your healthcare needs**    Thank you for allowing us to care for you today!   We want to ensure we can follow your treatment plan and we strive to give you the best outcomes and experience possible.   If you ever have a life threatening emergency and call 911 - for an ambulance (EMS)   Our providers can only care for you at:   [facility] or [facility].   Even if you have someone take you or you drive yourself we can only care for you in a [facility] facility. Our providers are not setup at the other healthcare locations!     Please be informed that if you contact our office outside of normal business hours the physician on call cannot help with any scheduling or rescheduling issues, procedure instruction questions or any type of medication issue.    We advise you for any urgent/emergency that you go to the nearest emergency room!    PLEASE CALL OUR OFFICE DURING NORMAL BUSINESS HOURS    Monday - Friday   8 am to 5 pm    [place]: [phone]    [place]: [phone]    [place]:  [phone]

## 2024-08-26 ENCOUNTER — HOSPITAL ENCOUNTER (OUTPATIENT)
Age: 79
Discharge: HOME | End: 2024-08-26
Payer: MEDICARE

## 2024-08-26 ENCOUNTER — HOSPITAL ENCOUNTER (OUTPATIENT)
Dept: GENERAL RADIOLOGY | Age: 79
Discharge: HOME | End: 2024-08-26
Payer: MEDICARE

## 2024-08-26 ENCOUNTER — TELEPHONE (OUTPATIENT)
Dept: CARDIOLOGY CLINIC | Age: 79
End: 2024-08-26

## 2024-08-26 DIAGNOSIS — Z01.810 PRE-OPERATIVE CARDIOVASCULAR EXAMINATION: ICD-10-CM

## 2024-08-26 LAB
ABO/RH: NORMAL
ANTIBODY SCREEN: NEGATIVE
COMMENT: NORMAL
HCT VFR BLD CALC: 48.2 % (ref 37–47)
HEMOGLOBIN: 15.5 GM/DL (ref 12.5–16)
MCH RBC QN AUTO: 30.3 PG (ref 27–31)
MCHC RBC AUTO-ENTMCNC: 32.2 % (ref 32–36)
MCV RBC AUTO: 94.3 FL (ref 78–100)
PDW BLD-RTO: 12.4 % (ref 11.7–14.9)
PLATELET # BLD: 230 K/CU MM (ref 140–440)
PMV BLD AUTO: 10.3 FL (ref 7.5–11.1)
RBC # BLD: 5.11 M/CU MM (ref 4.2–5.4)
WBC # BLD: 5.4 K/CU MM (ref 4–10.5)

## 2024-08-26 PROCEDURE — 86850 RBC ANTIBODY SCREEN: CPT

## 2024-08-26 PROCEDURE — 71046 X-RAY EXAM CHEST 2 VIEWS: CPT

## 2024-08-26 PROCEDURE — 86900 BLOOD TYPING SEROLOGIC ABO: CPT

## 2024-08-26 PROCEDURE — 85027 COMPLETE CBC AUTOMATED: CPT

## 2024-08-26 PROCEDURE — 86901 BLOOD TYPING SEROLOGIC RH(D): CPT

## 2024-08-26 PROCEDURE — 36415 COLL VENOUS BLD VENIPUNCTURE: CPT

## 2024-08-26 NOTE — TELEPHONE ENCOUNTER
Patient given instructions over telephone on 8/26/24.  Procedure is scheduled for 8/28/24 @ 845am, w/arrival @ 645am, @ [facility]. Medication/Education Letter gone over with patient. Procedure and risks were explained to patient. Questions answered, Patient voiced understanding.        Patient was notified that surgery date or time could be changed due to an emergency. Patient voiced understanding.

## 2024-08-26 NOTE — TELEPHONE ENCOUNTER
[facility]     Dr. Karla Bach     LEFT HEART CATHETERIZATION WITH POSSIBLE PERCUTANEOUS CORONARY INTERVENTION    Patient Name: Carmela Grove   : 1945  MRN# [id number]    Date of Procedure: 24 Time: 845am Arrival Time: 645am    The catheterization and angiogram are usually outpatient procedures, however if stenting is needed you may need to stay overnight. You will need to arrive at the hospital two hours before the procedure.  You will go to registration in the main lobby.  You will need to arrange for someone to drive you home.      HOSPITAL:  [facility] ([facility]      X   If you have received orders for blood work and or a chest x-ray, please have         them done on assigned date at [facility],           [facility], or [facility].     X Please do not have anything by mouth after midnight prior to or 8 hours before   the procedure.    X You may take your medications with a sip of water in the morning of your               procedure or take them with you to the hospital                    X If you are taking Lasix (furosemide)   please do not take it the morning of your procedure.     X If you take  Eliquis, you should hold it for 48 hours before your procedure.

## 2024-08-26 NOTE — TELEPHONE ENCOUNTER
Test Ordered:  C    /  Insurance: [employer]  /  Authorization Status: Approved #[id number]

## 2024-08-28 ENCOUNTER — HOSPITAL ENCOUNTER (OUTPATIENT)
Age: 79
Setting detail: OUTPATIENT SURGERY
Discharge: HOME | End: 2024-08-28
Attending: INTERNAL MEDICINE | Admitting: INTERNAL MEDICINE
Payer: MEDICARE

## 2024-08-28 VITALS
TEMPERATURE: 96 F | RESPIRATION RATE: 18 BRPM | BODY MASS INDEX: 25.99 KG/M2 | DIASTOLIC BLOOD PRESSURE: 75 MMHG | WEIGHT: 156 LBS | SYSTOLIC BLOOD PRESSURE: 113 MMHG | HEART RATE: 86 BPM | OXYGEN SATURATION: 97 % | HEIGHT: 65 IN

## 2024-08-28 DIAGNOSIS — R06.02 SOB (SHORTNESS OF BREATH): ICD-10-CM

## 2024-08-28 LAB
ACTIVATED CLOTTING TIME, LOW RANGE: >400 SEC
ECHO BSA: 1.8 M2
EKG DIAGNOSIS: NORMAL
EKG Q-T INTERVAL: 410 MS
EKG QRS DURATION: 160 MS
EKG QTC CALCULATION (BAZETT): 520 MS
EKG R AXIS: -48 DEGREES
EKG T AXIS: -30 DEGREES
EKG VENTRICULAR RATE: 97 BPM

## 2024-08-28 PROCEDURE — 93010 ELECTROCARDIOGRAM REPORT: CPT | Performed by: INTERNAL MEDICINE

## 2024-08-28 PROCEDURE — 2500000003 HC RX 250 WO HCPCS: Performed by: INTERNAL MEDICINE

## 2024-08-28 PROCEDURE — 6360000004 HC RX CONTRAST MEDICATION: Performed by: INTERNAL MEDICINE

## 2024-08-28 PROCEDURE — 6370000000 HC RX 637 (ALT 250 FOR IP): Performed by: INTERNAL MEDICINE

## 2024-08-28 PROCEDURE — 92928 PRQ TCAT PLMT NTRAC ST 1 LES: CPT | Performed by: INTERNAL MEDICINE

## 2024-08-28 PROCEDURE — C1894 INTRO/SHEATH, NON-LASER: HCPCS | Performed by: INTERNAL MEDICINE

## 2024-08-28 PROCEDURE — C1887 CATHETER, GUIDING: HCPCS | Performed by: INTERNAL MEDICINE

## 2024-08-28 PROCEDURE — 93458 L HRT ARTERY/VENTRICLE ANGIO: CPT | Performed by: INTERNAL MEDICINE

## 2024-08-28 PROCEDURE — C1725 CATH, TRANSLUMIN NON-LASER: HCPCS | Performed by: INTERNAL MEDICINE

## 2024-08-28 PROCEDURE — 6360000002 HC RX W HCPCS: Performed by: INTERNAL MEDICINE

## 2024-08-28 PROCEDURE — 2580000003 HC RX 258: Performed by: INTERNAL MEDICINE

## 2024-08-28 PROCEDURE — 85347 COAGULATION TIME ACTIVATED: CPT

## 2024-08-28 PROCEDURE — 2709999900 HC NON-CHARGEABLE SUPPLY: Performed by: INTERNAL MEDICINE

## 2024-08-28 PROCEDURE — 7100000010 HC PHASE II RECOVERY - FIRST 15 MIN: Performed by: INTERNAL MEDICINE

## 2024-08-28 PROCEDURE — C1874 STENT, COATED/COV W/DEL SYS: HCPCS | Performed by: INTERNAL MEDICINE

## 2024-08-28 PROCEDURE — 93454 CORONARY ARTERY ANGIO S&I: CPT | Performed by: INTERNAL MEDICINE

## 2024-08-28 PROCEDURE — 93005 ELECTROCARDIOGRAM TRACING: CPT | Performed by: INTERNAL MEDICINE

## 2024-08-28 PROCEDURE — C1769 GUIDE WIRE: HCPCS | Performed by: INTERNAL MEDICINE

## 2024-08-28 DEVICE — STENT ONYXNG35022UX ONYX 3.50X22RX
Type: IMPLANTABLE DEVICE | Status: FUNCTIONAL
Brand: ONYX FRONTIER™

## 2024-08-28 RX ORDER — HEPARIN SODIUM 10000 [USP'U]/ML
INJECTION, SOLUTION INTRAVENOUS; SUBCUTANEOUS PRN
Status: DISCONTINUED | OUTPATIENT
Start: 2024-08-28 | End: 2024-08-28 | Stop reason: HOSPADM

## 2024-08-28 RX ORDER — ROSUVASTATIN CALCIUM 20 MG/1
20 TABLET, COATED ORAL NIGHTLY
Qty: 30 TABLET | Refills: 3 | Status: SHIPPED | OUTPATIENT
Start: 2024-08-28 | End: 2024-08-28 | Stop reason: HOSPADM

## 2024-08-28 RX ORDER — ASPIRIN 325 MG
TABLET, DELAYED RELEASE (ENTERIC COATED) ORAL PRN
Status: DISCONTINUED | OUTPATIENT
Start: 2024-08-28 | End: 2024-08-28 | Stop reason: HOSPADM

## 2024-08-28 RX ORDER — IOPAMIDOL 755 MG/ML
INJECTION, SOLUTION INTRAVASCULAR PRN
Status: DISCONTINUED | OUTPATIENT
Start: 2024-08-28 | End: 2024-08-28 | Stop reason: HOSPADM

## 2024-08-28 RX ORDER — ACETAMINOPHEN 325 MG/1
650 TABLET ORAL EVERY 4 HOURS PRN
Status: DISCONTINUED | OUTPATIENT
Start: 2024-08-28 | End: 2024-08-28 | Stop reason: HOSPADM

## 2024-08-28 RX ORDER — ASPIRIN 81 MG/1
81 TABLET ORAL DAILY
Qty: 90 TABLET | Refills: 0 | Status: SHIPPED | OUTPATIENT
Start: 2024-08-28

## 2024-08-28 RX ORDER — SODIUM CHLORIDE 0.9 % (FLUSH) 0.9 %
5-40 SYRINGE (ML) INJECTION PRN
Status: DISCONTINUED | OUTPATIENT
Start: 2024-08-28 | End: 2024-08-28 | Stop reason: HOSPADM

## 2024-08-28 RX ORDER — METOPROLOL SUCCINATE 25 MG/1
25 TABLET, EXTENDED RELEASE ORAL DAILY
Qty: 30 TABLET | Refills: 3 | Status: SHIPPED | OUTPATIENT
Start: 2024-08-28

## 2024-08-28 RX ORDER — HEPARIN SODIUM 200 [USP'U]/100ML
INJECTION, SOLUTION INTRAVENOUS PRN
Status: DISCONTINUED | OUTPATIENT
Start: 2024-08-28 | End: 2024-08-28 | Stop reason: HOSPADM

## 2024-08-28 RX ORDER — CLOPIDOGREL BISULFATE 75 MG/1
75 TABLET ORAL DAILY
Qty: 30 TABLET | Refills: 3 | Status: SHIPPED | OUTPATIENT
Start: 2024-08-28

## 2024-08-28 RX ORDER — ASPIRIN 81 MG/1
81 TABLET, CHEWABLE ORAL DAILY
Status: CANCELLED | OUTPATIENT
Start: 2024-08-29

## 2024-08-28 RX ORDER — NITROGLYCERIN 20 MG/100ML
INJECTION INTRAVENOUS PRN
Status: DISCONTINUED | OUTPATIENT
Start: 2024-08-28 | End: 2024-08-28 | Stop reason: HOSPADM

## 2024-08-28 RX ORDER — DIPHENHYDRAMINE HCL 25 MG
25 TABLET ORAL ONCE
Status: COMPLETED | OUTPATIENT
Start: 2024-08-28 | End: 2024-08-28

## 2024-08-28 RX ORDER — SODIUM CHLORIDE 0.9 % (FLUSH) 0.9 %
5-40 SYRINGE (ML) INJECTION EVERY 12 HOURS SCHEDULED
Status: DISCONTINUED | OUTPATIENT
Start: 2024-08-28 | End: 2024-08-28 | Stop reason: HOSPADM

## 2024-08-28 RX ORDER — CLOPIDOGREL BISULFATE 75 MG/1
TABLET ORAL PRN
Status: DISCONTINUED | OUTPATIENT
Start: 2024-08-28 | End: 2024-08-28 | Stop reason: HOSPADM

## 2024-08-28 RX ORDER — CLOPIDOGREL BISULFATE 75 MG/1
75 TABLET ORAL DAILY
Status: CANCELLED | OUTPATIENT
Start: 2024-08-29

## 2024-08-28 RX ORDER — SODIUM CHLORIDE 9 MG/ML
INJECTION, SOLUTION INTRAVENOUS CONTINUOUS
Status: DISCONTINUED | OUTPATIENT
Start: 2024-08-28 | End: 2024-08-28 | Stop reason: HOSPADM

## 2024-08-28 RX ORDER — DIAZEPAM 5 MG
5 TABLET ORAL ONCE
Status: COMPLETED | OUTPATIENT
Start: 2024-08-28 | End: 2024-08-28

## 2024-08-28 RX ORDER — EPTIFIBATIDE 2 MG/ML
INJECTION, SOLUTION INTRAVENOUS PRN
Status: DISCONTINUED | OUTPATIENT
Start: 2024-08-28 | End: 2024-08-28 | Stop reason: HOSPADM

## 2024-08-28 RX ORDER — SODIUM CHLORIDE 9 MG/ML
INJECTION, SOLUTION INTRAVENOUS CONTINUOUS PRN
Status: COMPLETED | OUTPATIENT
Start: 2024-08-28 | End: 2024-08-28

## 2024-08-28 RX ORDER — SODIUM CHLORIDE 9 MG/ML
INJECTION, SOLUTION INTRAVENOUS PRN
Status: DISCONTINUED | OUTPATIENT
Start: 2024-08-28 | End: 2024-08-28 | Stop reason: HOSPADM

## 2024-08-28 RX ADMIN — SODIUM CHLORIDE: 9 INJECTION, SOLUTION INTRAVENOUS at 07:59

## 2024-08-28 RX ADMIN — DIAZEPAM 5 MG: 5 TABLET ORAL at 07:59

## 2024-08-28 RX ADMIN — DIPHENHYDRAMINE HYDROCHLORIDE 25 MG: 25 TABLET ORAL at 07:59

## 2024-08-28 NOTE — PROGRESS NOTES
Outpatient Pharmacy Progress Note for Meds-to-Beds    Total number of Prescriptions Filled: 3    Additional Documentation:  Patient's family member picked-up the medication(s) in the OP Pharmacy      Thank you for letting us serve your patients.  [address] [address]    Phone: [phone]    Fax: [phone]

## 2024-08-28 NOTE — PROGRESS NOTES
At 1140 I attempted to remove the VascBand and apply a sterile bandage to the right radial site and the site had reopened and began to bleed.  I held pressure for 20minutes.  Hemostasis achieved, sterile dressing applied, and arm board reapplied.  The patient remained calm and free of discomfort.  The patient is on the monitor, call light within reach, and family at bedside. Boxed lunches provided to patient and . The patient may be discharged at 1300.

## 2024-08-28 NOTE — H&P
CC:   Carmela  is an established 79 y.o.  female here for a follow up on stress test     For LHC     SUBJECTIVE/OBJECTIVE:  HPI  Carmela is a 79 y.o. female with a history of PAF, valvular heart disease, HFpEF,  hyperlipidemia and pacemaker     Carmela recently underwent a treadmill stress test to assess for ischemia in the setting of new onset of atrial fibrillation.  Carmela today reports back pain extending to the left leg.  Her shortness of breath seems to have improved.     Review of Systems   Constitutional: Negative for diaphoresis and malaise/fatigue.   Cardiovascular:  Negative for chest pain, claudication, dyspnea on exertion, irregular heartbeat, leg swelling, near-syncope, orthopnea, palpitations and paroxysmal nocturnal dyspnea.   Respiratory:  Negative for shortness of breath.    Musculoskeletal:  Positive for back pain.   Gastrointestinal:  Negative for bloating.   Neurological:  Negative for dizziness and light-headedness.         Vitals       Vitals:     08/23/24 1020   BP: 118/78   Site: Right Upper Arm   Position: Sitting   Cuff Size: Medium Adult   Pulse: 58   SpO2: 99%   Weight: 72.5 kg (159 lb 12.8 oz)   Height: 1.651 m (5' 5\")                Wt Readings from Last 3 Encounters:   08/23/24 72.5 kg (159 lb 12.8 oz)   08/14/24 73.5 kg (162 lb)   08/13/24 73.5 kg (162 lb)      Body mass index is 26.59 kg/m².      Physical Exam  Vitals reviewed.   Eyes:      Pupils: Pupils are equal, round, and reactive to light.   Neck:      Vascular: No carotid bruit.   Cardiovascular:      Rate and Rhythm: Normal rate. Rhythm irregular.      Pulses: Normal pulses.   Pulmonary:      Effort: Pulmonary effort is normal.      Breath sounds: Rales present. Wheezes: right posterior base.  Chest:      Chest wall: No tenderness.   Musculoskeletal:      Cervical back: No tenderness.      Right lower leg: No edema.      Left lower leg: No edema.   Skin:     General: Skin is warm and dry.      Capillary Refill: Capillary refill    Stress Cardiolite August 21, 2024    Image quality is good.    Stress Test: A Vitaliy protocol stress test was performed. Overall, the patient's exercise capacity was average for their age. The patient reached stage 2 of the protocol and was stressed for 5 min and 0 sec. Hemodynamics are adequate for diagnosis. Blood pressure demonstrated a normal response and heart rate demonstrated a normal response to stress. The patient's heart rate recovery was normal.    Nonspecific ST and T wave changes    Cardiolite study demonstrates an area of reduced tracer uptake of the apex of the left ventricle with improvement on the resting images the rest of the myocardium shows normal tracer uptake ejection fraction by gated study is 60% with normal global and regional left ventricular systolic function    Cardiolite study demonstrates apical ischemia with normal EF  HFpEF  Clinically appears to be euvolemic her shortness of breath is improved.     Valvular heart disease  Recent transesophageal echocardiogram moderate to severe TR along with moderate to severe MR with prolapse of the A2 segment of the anterior mitral valve leaflet.  She denies symptoms of shortness of breath.  Recommend medical management at this time with ongoing surveillance of routine echocardiograms     Atrial fibrillation-  Underwent ARYA/cardioversion.  She is irregularly irregular on today's visit.  Will continue with anticoagulation for stroke prophylaxis.  To follow-up with electrophysiology : on Rythmol-May need to be discontinued post heart cath if CAD identified.  Continue with diltiazem     Tests ordered: Left heart catheterization

## 2024-08-28 NOTE — PROGRESS NOTES
The patient was given discharge instructions.  All questions reviewed and answered.  The patient was escorted up to the bathroom and then changed into personal clothing.  The IV was removed and dressing applied.  The patient was then transported via wheelchair to the main entrance for  by family.

## 2024-08-29 ENCOUNTER — TELEPHONE (OUTPATIENT)
Dept: CARDIOLOGY CLINIC | Age: 79
End: 2024-08-29

## 2024-09-03 ENCOUNTER — TELEPHONE (OUTPATIENT)
Dept: CARDIOLOGY CLINIC | Age: 79
End: 2024-09-03

## 2024-09-03 NOTE — TELEPHONE ENCOUNTER
Spoke w/pt after recent procedure. Pt stated she is feeling well and denies sx at this time. Pt confirmed f/u appt scheduled for 9/17/24.

## 2024-09-04 ENCOUNTER — OFFICE VISIT (OUTPATIENT)
Dept: CARDIOLOGY CLINIC | Age: 79
End: 2024-09-04
Payer: MEDICARE

## 2024-09-04 VITALS
SYSTOLIC BLOOD PRESSURE: 120 MMHG | DIASTOLIC BLOOD PRESSURE: 82 MMHG | WEIGHT: 160.6 LBS | BODY MASS INDEX: 26.76 KG/M2 | HEIGHT: 65 IN | HEART RATE: 84 BPM

## 2024-09-04 DIAGNOSIS — I48.0 PAF (PAROXYSMAL ATRIAL FIBRILLATION) (HCC): ICD-10-CM

## 2024-09-04 DIAGNOSIS — I48.19 PERSISTENT ATRIAL FIBRILLATION (HCC): Primary | ICD-10-CM

## 2024-09-04 PROCEDURE — G8427 DOCREV CUR MEDS BY ELIG CLIN: HCPCS | Performed by: INTERNAL MEDICINE

## 2024-09-04 PROCEDURE — G8417 CALC BMI ABV UP PARAM F/U: HCPCS | Performed by: INTERNAL MEDICINE

## 2024-09-04 PROCEDURE — 99214 OFFICE O/P EST MOD 30 MIN: CPT | Performed by: INTERNAL MEDICINE

## 2024-09-04 PROCEDURE — 1036F TOBACCO NON-USER: CPT | Performed by: INTERNAL MEDICINE

## 2024-09-04 PROCEDURE — 93000 ELECTROCARDIOGRAM COMPLETE: CPT | Performed by: INTERNAL MEDICINE

## 2024-09-04 PROCEDURE — 1123F ACP DISCUSS/DSCN MKR DOCD: CPT | Performed by: INTERNAL MEDICINE

## 2024-09-04 PROCEDURE — 1090F PRES/ABSN URINE INCON ASSESS: CPT | Performed by: INTERNAL MEDICINE

## 2024-09-04 PROCEDURE — G8400 PT W/DXA NO RESULTS DOC: HCPCS | Performed by: INTERNAL MEDICINE

## 2024-09-04 RX ORDER — AMIODARONE HYDROCHLORIDE 200 MG/1
200 TABLET ORAL DAILY
Qty: 30 TABLET | Refills: 3 | Status: SHIPPED | OUTPATIENT
Start: 2024-09-04 | End: 2024-09-17 | Stop reason: SDUPTHER

## 2024-09-06 ENCOUNTER — HOSPITAL ENCOUNTER (OUTPATIENT)
Age: 79
Discharge: HOME | End: 2024-09-06
Payer: MEDICARE

## 2024-09-06 DIAGNOSIS — I48.0 PAF (PAROXYSMAL ATRIAL FIBRILLATION) (HCC): ICD-10-CM

## 2024-09-06 LAB — TSH SERPL DL<=0.005 MIU/L-ACNC: 3.77 UIU/ML (ref 0.27–4.2)

## 2024-09-06 PROCEDURE — 84443 ASSAY THYROID STIM HORMONE: CPT

## 2024-09-06 PROCEDURE — 36415 COLL VENOUS BLD VENIPUNCTURE: CPT

## 2024-09-10 NOTE — PROGRESS NOTES
Procedure note  Left cardiac catheterization selective coronary angiography  Angioplasty and stenting of the LAD      Abnormal Cardiolite stress test with dyspnea    Findings  The left main is a large tubular vessel  The left and descending artery is a large-caliber vessel has 80% stenosis in its proximal segment which was reduced to 0% with primary stenting the ostial dimension of the stent was taken up to 3.8 mm there was a small diagonal was jailed however had COLBY-3 flow at the end of the procedure  COLBY flow post 3 before and after the procedure  The circumflex vessel has no significant stenosis  Right coronary artery is a dominant vessel has no significant stenosis    Assessment and plan Will continue with DAPT and Eliquis after 30 days we can come off the aspirin and continue Plavix and Eliquis  Patient was on propafenone as patient has coronary artery disease has propafenone cannot be continued, I will reassess in the office and discuss about either sotalol or amiodarone  Risk factor modification will be continued   [Gradual] : gradual [8] : 8 [5] : 5 [Throbbing] : throbbing [Tingling] : tingling [Constant] : constant [Full time] : Work status: full time

## 2024-09-12 ENCOUNTER — TELEPHONE (OUTPATIENT)
Dept: CARDIOLOGY CLINIC | Age: 79
End: 2024-09-12

## 2024-09-13 ENCOUNTER — TELEPHONE (OUTPATIENT)
Dept: CARDIOLOGY CLINIC | Age: 79
End: 2024-09-13

## 2024-09-17 ENCOUNTER — OFFICE VISIT (OUTPATIENT)
Dept: CARDIOLOGY CLINIC | Age: 79
End: 2024-09-17
Payer: MEDICARE

## 2024-09-17 VITALS
HEIGHT: 65 IN | HEART RATE: 71 BPM | SYSTOLIC BLOOD PRESSURE: 110 MMHG | DIASTOLIC BLOOD PRESSURE: 76 MMHG | WEIGHT: 164.4 LBS | BODY MASS INDEX: 27.39 KG/M2

## 2024-09-17 DIAGNOSIS — I25.10 CORONARY ARTERY DISEASE INVOLVING NATIVE CORONARY ARTERY OF NATIVE HEART WITHOUT ANGINA PECTORIS: Primary | ICD-10-CM

## 2024-09-17 DIAGNOSIS — M79.89 SWELLING OF EXTREMITY: ICD-10-CM

## 2024-09-17 PROCEDURE — 1123F ACP DISCUSS/DSCN MKR DOCD: CPT | Performed by: INTERNAL MEDICINE

## 2024-09-17 PROCEDURE — 99214 OFFICE O/P EST MOD 30 MIN: CPT | Performed by: INTERNAL MEDICINE

## 2024-09-17 PROCEDURE — G8417 CALC BMI ABV UP PARAM F/U: HCPCS | Performed by: INTERNAL MEDICINE

## 2024-09-17 PROCEDURE — 1036F TOBACCO NON-USER: CPT | Performed by: INTERNAL MEDICINE

## 2024-09-17 PROCEDURE — 93000 ELECTROCARDIOGRAM COMPLETE: CPT | Performed by: INTERNAL MEDICINE

## 2024-09-17 PROCEDURE — 1090F PRES/ABSN URINE INCON ASSESS: CPT | Performed by: INTERNAL MEDICINE

## 2024-09-17 PROCEDURE — G8400 PT W/DXA NO RESULTS DOC: HCPCS | Performed by: INTERNAL MEDICINE

## 2024-09-17 PROCEDURE — G8427 DOCREV CUR MEDS BY ELIG CLIN: HCPCS | Performed by: INTERNAL MEDICINE

## 2024-09-17 RX ORDER — AMIODARONE HYDROCHLORIDE 200 MG/1
200 TABLET ORAL DAILY
Qty: 90 TABLET | Refills: 3 | Status: SHIPPED | OUTPATIENT
Start: 2024-09-17

## 2024-09-17 RX ORDER — CLOPIDOGREL BISULFATE 75 MG/1
75 TABLET ORAL DAILY
Qty: 90 TABLET | Refills: 3 | Status: SHIPPED | OUTPATIENT
Start: 2024-09-17

## 2024-09-23 ENCOUNTER — TELEPHONE (OUTPATIENT)
Dept: CARDIOLOGY CLINIC | Age: 79
End: 2024-09-23

## 2024-09-24 ASSESSMENT — ENCOUNTER SYMPTOMS
BACK PAIN: 1
ABDOMINAL PAIN: 0
CHEST TIGHTNESS: 0
NAUSEA: 0
TROUBLE SWALLOWING: 0
BLOOD IN STOOL: 0
SHORTNESS OF BREATH: 0

## 2024-10-08 ENCOUNTER — TELEPHONE (OUTPATIENT)
Dept: CARDIOLOGY CLINIC | Age: 79
End: 2024-10-08

## 2024-10-08 NOTE — TELEPHONE ENCOUNTER
Notified of normal results: knows about her OV on 10/15/24      Vascular US Duplex Lower Extremity Venous Bilateral   No evidence of significant reflux or thrombosis in bilateral lower extremity venous system.

## 2024-10-15 ENCOUNTER — OFFICE VISIT (OUTPATIENT)
Dept: CARDIOLOGY CLINIC | Age: 79
End: 2024-10-15
Payer: MEDICARE

## 2024-10-15 VITALS
HEART RATE: 92 BPM | DIASTOLIC BLOOD PRESSURE: 74 MMHG | WEIGHT: 164 LBS | HEIGHT: 65 IN | OXYGEN SATURATION: 97 % | SYSTOLIC BLOOD PRESSURE: 120 MMHG | BODY MASS INDEX: 27.32 KG/M2

## 2024-10-15 DIAGNOSIS — I48.0 PAF (PAROXYSMAL ATRIAL FIBRILLATION) (HCC): Primary | ICD-10-CM

## 2024-10-15 PROCEDURE — 1090F PRES/ABSN URINE INCON ASSESS: CPT | Performed by: INTERNAL MEDICINE

## 2024-10-15 PROCEDURE — G8417 CALC BMI ABV UP PARAM F/U: HCPCS | Performed by: INTERNAL MEDICINE

## 2024-10-15 PROCEDURE — G8484 FLU IMMUNIZE NO ADMIN: HCPCS | Performed by: INTERNAL MEDICINE

## 2024-10-15 PROCEDURE — 1123F ACP DISCUSS/DSCN MKR DOCD: CPT | Performed by: INTERNAL MEDICINE

## 2024-10-15 PROCEDURE — 99214 OFFICE O/P EST MOD 30 MIN: CPT | Performed by: INTERNAL MEDICINE

## 2024-10-15 PROCEDURE — G8427 DOCREV CUR MEDS BY ELIG CLIN: HCPCS | Performed by: INTERNAL MEDICINE

## 2024-10-15 PROCEDURE — G8400 PT W/DXA NO RESULTS DOC: HCPCS | Performed by: INTERNAL MEDICINE

## 2024-10-15 PROCEDURE — 1036F TOBACCO NON-USER: CPT | Performed by: INTERNAL MEDICINE

## 2024-10-15 NOTE — PROGRESS NOTES
CC:   Carmela  is an established 79 y.o.  female here for a follow up on stress test     For LHC  fu on v doppler       SUBJECTIVE/OBJECTIVE:  HPI  Carmela is a 79 y.o. female with a history of PAF, valvular heart disease, HFpEF,  hyperlipidemia and pacemaker     Carmela recently underwent a treadmill stress test to assess for ischemia in the setting of new onset of atrial fibrillation.  Carmela today reports back pain extending to the left leg.  Her shortness of breath seems to have improved.     Review of Systems   Constitutional: Negative for diaphoresis and malaise/fatigue.   Cardiovascular:  Negative for chest pain, claudication, dyspnea on exertion, irregular heartbeat, leg swelling, near-syncope, orthopnea, palpitations and paroxysmal nocturnal dyspnea.   Respiratory:  Negative for shortness of breath.    Musculoskeletal:  Positive for back pain.   Gastrointestinal:  Negative for bloating.   Neurological:  Negative for dizziness and light-headedness.         Vitals       Vitals:     08/23/24 1020   BP: 118/78   Site: Right Upper Arm   Position: Sitting   Cuff Size: Medium Adult   Pulse: 58   SpO2: 99%   Weight: 72.5 kg (159 lb 12.8 oz)   Height: 1.651 m (5' 5\")                Wt Readings from Last 3 Encounters:   08/23/24 72.5 kg (159 lb 12.8 oz)   08/14/24 73.5 kg (162 lb)   08/13/24 73.5 kg (162 lb)      Body mass index is 26.59 kg/m².      Physical Exam  Vitals reviewed.   Eyes:      Pupils: Pupils are equal, round, and reactive to light.   Neck:      Vascular: No carotid bruit.   Cardiovascular:      Rate and Rhythm: Normal rate. Rhythm irregular.      Pulses: Normal pulses.   Pulmonary:      Effort: Pulmonary effort is normal.      Breath sounds: Rales present. Wheezes: right posterior base.  Chest:      Chest wall: No tenderness.   Musculoskeletal:      Cervical back: No tenderness.      Right lower leg: No edema.      Left lower leg: No edema.   Skin:     General: Skin is warm and dry.      Capillary Refill:

## 2024-12-12 ENCOUNTER — TELEPHONE (OUTPATIENT)
Dept: CARDIOLOGY CLINIC | Age: 79
End: 2024-12-12

## 2024-12-12 NOTE — TELEPHONE ENCOUNTER
ischemia with normal EF    OV to discuss results and possible cardiac catheterization      Echo-  8/13/2024   Left Ventricle: Normal left ventricular systolic function with a visually estimated EF of 50 - 55%. Left ventricle size is normal. Mildly increased wall thickness. Normal wall motion. Indeterminate diastolic function due to atrial fibrillation.    Right Ventricle:  PPM wire noted on the right side of the heart.    Mitral Valve: Annular calcification. Moderate regurgitation.    Tricuspid Valve: Mild regurgitation. The estimated RVSP is 30 mmHg.    Left Atrium: Left atrium is moderately dilated.    Pericardium: No pericardial effusion.    Image quality is good.      Cath- 8/28/2024  The left main is a large tubular vessel  The left and descending artery is a large-caliber vessel has 80% stenosis in its proximal segment which was reduced to 0% with primary stenting the ostial dimension of the stent was taken up to 3.8 mm there was a small diagonal was jailed however had COLBY-3 flow at the end of the procedure  COLBY flow post 3 before and after the procedure  The circumflex vessel has no significant stenosis  Right coronary artery is a dominant vessel has no significant stenosis      Pacemaker insert- 5/30/2019

## 2025-01-07 ENCOUNTER — TELEPHONE (OUTPATIENT)
Dept: CARDIOLOGY CLINIC | Age: 80
End: 2025-01-07

## 2025-01-07 NOTE — TELEPHONE ENCOUNTER
Pt called in stating she was to get an ablation for Afib in October but had to put it off. She is wanting to see about rescheduling.

## 2025-01-07 NOTE — TELEPHONE ENCOUNTER
Called patient, she is having a spinal stimulator placed in feb-march and wants to have ablation. Advised patient she can not hold her blood thinner for 3 mos after ablation. Patient's last stimulator is on 3/7/2025. Per Ernestine patient will need to wait 1 month post stimulator to get ablation. Patient scheduled for ablation on 4/8/2025 @ 0700 and PPW 3/31/2025 @ 0900. Patient stated understanding.

## 2025-01-13 PROCEDURE — 93296 REM INTERROG EVL PM/IDS: CPT | Performed by: INTERNAL MEDICINE

## 2025-01-13 PROCEDURE — 93294 REM INTERROG EVL PM/LDLS PM: CPT | Performed by: INTERNAL MEDICINE

## 2025-01-15 ENCOUNTER — APPOINTMENT (OUTPATIENT)
Dept: GENERAL RADIOLOGY | Age: 80
End: 2025-01-15
Payer: MEDICARE

## 2025-01-15 ENCOUNTER — HOSPITAL ENCOUNTER (EMERGENCY)
Age: 80
Discharge: HOME | End: 2025-01-15
Attending: STUDENT IN AN ORGANIZED HEALTH CARE EDUCATION/TRAINING PROGRAM
Payer: MEDICARE

## 2025-01-15 VITALS
SYSTOLIC BLOOD PRESSURE: 125 MMHG | BODY MASS INDEX: 25.99 KG/M2 | TEMPERATURE: 97.9 F | HEART RATE: 72 BPM | DIASTOLIC BLOOD PRESSURE: 57 MMHG | HEIGHT: 65 IN | OXYGEN SATURATION: 98 % | RESPIRATION RATE: 18 BRPM | WEIGHT: 156 LBS

## 2025-01-15 DIAGNOSIS — R42 ORTHOSTATIC LIGHTHEADEDNESS: Primary | ICD-10-CM

## 2025-01-15 LAB
ANION GAP SERPL CALCULATED.3IONS-SCNC: 9 MMOL/L (ref 9–17)
BASOPHILS # BLD: 0.01 K/UL
BASOPHILS NFR BLD: 0 % (ref 0–1)
BUN SERPL-MCNC: 19 MG/DL (ref 7–20)
CALCIUM SERPL-MCNC: 9.1 MG/DL (ref 8.3–10.6)
CHLORIDE SERPL-SCNC: 104 MMOL/L (ref 99–110)
CO2 SERPL-SCNC: 26 MMOL/L (ref 21–32)
CREAT SERPL-MCNC: 0.8 MG/DL (ref 0.6–1.2)
EKG ATRIAL RATE: 62 BPM
EKG DIAGNOSIS: NORMAL
EKG P AXIS: 73 DEGREES
EKG P-R INTERVAL: 154 MS
EKG Q-T INTERVAL: 464 MS
EKG QRS DURATION: 124 MS
EKG QTC CALCULATION (BAZETT): 470 MS
EKG R AXIS: -18 DEGREES
EKG T AXIS: -29 DEGREES
EKG VENTRICULAR RATE: 62 BPM
EOSINOPHIL # BLD: 0.05 K/UL
EOSINOPHILS RELATIVE PERCENT: 1 % (ref 0–3)
ERYTHROCYTE [DISTWIDTH] IN BLOOD BY AUTOMATED COUNT: 15.5 % (ref 11.7–14.9)
GFR, ESTIMATED: 67 ML/MIN/1.73M2
GLUCOSE SERPL-MCNC: 92 MG/DL (ref 74–99)
HCT VFR BLD AUTO: 38.7 % (ref 37–47)
HGB BLD-MCNC: 12.9 G/DL (ref 12.5–16)
IMM GRANULOCYTES # BLD AUTO: 0.03 K/UL
IMM GRANULOCYTES NFR BLD: 1 %
LYMPHOCYTES NFR BLD: 0.97 K/UL
LYMPHOCYTES RELATIVE PERCENT: 18 % (ref 24–44)
MAGNESIUM SERPL-MCNC: 2.3 MG/DL (ref 1.8–2.4)
MCH RBC QN AUTO: 29.7 PG (ref 27–31)
MCHC RBC AUTO-ENTMCNC: 33.3 G/DL (ref 32–36)
MCV RBC AUTO: 89 FL (ref 78–100)
MONOCYTES NFR BLD: 0.42 K/UL
MONOCYTES NFR BLD: 8 % (ref 0–4)
NEUTROPHILS NFR BLD: 73 % (ref 36–66)
NEUTS SEG NFR BLD: 4 K/UL
PLATELET # BLD AUTO: 198 K/UL (ref 140–440)
PMV BLD AUTO: 9.3 FL (ref 7.5–11.1)
POTASSIUM SERPL-SCNC: 4.2 MMOL/L (ref 3.5–5.1)
RBC # BLD AUTO: 4.35 M/UL (ref 4.2–5.4)
SODIUM SERPL-SCNC: 139 MMOL/L (ref 136–145)
WBC OTHER # BLD: 5.5 K/UL (ref 4–10.5)

## 2025-01-15 PROCEDURE — 71045 X-RAY EXAM CHEST 1 VIEW: CPT

## 2025-01-15 PROCEDURE — 2580000003 HC RX 258: Performed by: STUDENT IN AN ORGANIZED HEALTH CARE EDUCATION/TRAINING PROGRAM

## 2025-01-15 PROCEDURE — 99285 EMERGENCY DEPT VISIT HI MDM: CPT

## 2025-01-15 PROCEDURE — 85025 COMPLETE CBC W/AUTO DIFF WBC: CPT

## 2025-01-15 PROCEDURE — 93010 ELECTROCARDIOGRAM REPORT: CPT | Performed by: INTERNAL MEDICINE

## 2025-01-15 PROCEDURE — 93005 ELECTROCARDIOGRAM TRACING: CPT | Performed by: STUDENT IN AN ORGANIZED HEALTH CARE EDUCATION/TRAINING PROGRAM

## 2025-01-15 PROCEDURE — 80048 BASIC METABOLIC PNL TOTAL CA: CPT

## 2025-01-15 PROCEDURE — 83735 ASSAY OF MAGNESIUM: CPT

## 2025-01-15 RX ORDER — 0.9 % SODIUM CHLORIDE 0.9 %
1000 INTRAVENOUS SOLUTION INTRAVENOUS
Status: DISCONTINUED | OUTPATIENT
Start: 2025-01-15 | End: 2025-01-15 | Stop reason: HOSPADM

## 2025-01-15 RX ORDER — 0.9 % SODIUM CHLORIDE 0.9 %
1000 INTRAVENOUS SOLUTION INTRAVENOUS ONCE
Status: COMPLETED | OUTPATIENT
Start: 2025-01-15 | End: 2025-01-15

## 2025-01-15 RX ADMIN — SODIUM CHLORIDE 1000 ML: 9 INJECTION, SOLUTION INTRAVENOUS at 14:04

## 2025-01-15 ASSESSMENT — PAIN SCALES - GENERAL: PAINLEVEL_OUTOF10: 0

## 2025-01-15 NOTE — ED PROVIDER NOTES
Disp-90 tablet, R-0Normal      Handicap Placard MISC Starting Tue 2024, Disp-1 each, R-0, PrintStart 2024 End 2029      furosemide (LASIX) 20 MG tablet Take 1 tablet by mouth daily for 5 days, Disp-5 tablet, R-0Normal      potassium chloride (KLOR-CON M) 20 MEQ extended release tablet Take 1 tablet by mouth daily for 7 days, Disp-7 tablet, R-0Normal      !! apixaban (ELIQUIS) 5 MG TABS tablet Take 1 tablet by mouth 2 times daily, Disp-180 tablet, R-3Normal      dilTIAZem (CARDIZEM CD) 240 MG extended release capsule Take 1 capsule by mouth daily, Disp-90 capsule, R-3Normal      !! apixaban (ELIQUIS) 5 MG TABS tablet Take 1 tablet by mouth 2 times daily, Disp-28 tablet, R-0Lot # [id number] Exp  # 28Sample      Zolpidem Tartrate (AMBIEN PO) Take by mouthHistorical Med      MAGNESIUM CITRATE PO Take 250 mg by mouth 2 times dailyHistorical Med      donepezil (ARICEPT) 5 MG tablet Take 1 tablet by mouth nightlyHistorical Med      Cyanocobalamin (VITAMIN B-12 PO) Take 2,500 mcg by mouthHistorical Med      montelukast (SINGULAIR) 10 MG tablet Take 1 tablet by mouth nightlyHistorical Med      Calcium Carbonate-Vitamin D (CALCIUM 500/D PO) Take 500 mg by mouth 2 times dailyHistorical Med      atorvastatin (LIPITOR) 40 MG tablet Take 1 tablet by mouth dailyHistorical Med       !! - Potential duplicate medications found. Please discuss with provider.        Allergies   Allergen Reactions    Seasonal      Other reaction(s): Itchy Eyes, Runny Nose     Past Surgical History:   Procedure Laterality Date    BREAST SURGERY      left nipple biopsy    CARDIAC PACEMAKER PLACEMENT Left 2019    [employer]onic Dual Yvette    CARDIAC PROCEDURE N/A 2024    Left heart cath / coronary angiography performed by Karla Bach MD at [facility] CARDIAC CATH LAB    CARDIAC PROCEDURE N/A 2024    Percutaneous coronary intervention performed by Karla Bach MD at [facility] CARDIAC CATH LAB     SECTION      HYSTERECTOMY,

## 2025-01-15 NOTE — DISCHARGE INSTRUCTIONS
Make sure to eat and drink plenty of fluids to stay well-hydrated.  Call and follow-up with your family doctor in the next 1-3 days  Return to the ED if your symptoms worsen or you feel you need to be reevaluated

## 2025-01-15 NOTE — ED TRIAGE NOTES
Patient to ED for orthostatic hypotension. Patient called EMS due to dizziness when standing. Patient denies dizziness when sitting. /80 sitting and 90/50 when standing. Patient has a right bundle branch block on EKG, unsure if hx of. Patient does have a hx of a fib.

## 2025-02-17 ENCOUNTER — TELEPHONE (OUTPATIENT)
Dept: CARDIOLOGY CLINIC | Age: 80
End: 2025-02-17

## 2025-02-17 NOTE — TELEPHONE ENCOUNTER
Cardiologist: Dr. Bach  Surgeon: Dr. Powers  Surgery: Insertion Spinal Cord stimulator temporary  Surgery/Procedure should be done in the hospital setting    _____ yes    _____  no    Anesthesia: [facility]  Date: 3/3/2025  Fax# [phone]  # [phone]      Last OV 10/15/2024 w/Mikala      normal v doppler           - hold toprol has diarrhea        -     CORONARY ARTERY DISEASE:  asymptomatic     All available  tests in chart reviewed. Management discussed .  Testing ordered  no  On dapt  Rehab dw pt                             '  Shortness of breath with abnormal stress test  Procedure note  Left cardiac catheterization selective coronary angiography  Angioplasty and stenting of the LAD  Valvular heart disease  Recent transesophageal echocardiogram moderate to severe TR along with moderate to severe MR with prolapse of the A2 segment of the anterior mitral valve leaflet.  She denies symptoms of shortness of breath.  Recommend medical management at this time with ongoing surveillance of routine echocardiograms     - PPM on carelink     Atrial fibrillation-  In sr today  Per EP ablation in future        Last EKG- 1/15/2025        NM- 8/21/2024   Image quality is good.    Stress Test: A Vitaliy protocol stress test was performed. Overall, the patient's exercise capacity was average for their age. The patient reached stage 2 of the protocol and was stressed for 5 min and 0 sec. Hemodynamics are adequate for diagnosis. Blood pressure demonstrated a normal response and heart rate demonstrated a normal response to stress. The patient's heart rate recovery was normal.    Nonspecific ST and T wave changes    Cardiolite study demonstrates an area of reduced tracer uptake of the apex of the left ventricle with improvement on the resting images the rest of the myocardium shows normal tracer uptake ejection fraction by gated study is 60% with normal global and regional left ventricular systolic function    Cardiolite

## 2025-02-28 ENCOUNTER — TELEPHONE (OUTPATIENT)
Dept: CARDIOLOGY CLINIC | Age: 80
End: 2025-02-28

## 2025-02-28 NOTE — TELEPHONE ENCOUNTER
Farzana with pre-admission called needing information on pacer. He is getting a Spinal stimulator. [phone]

## 2025-02-28 NOTE — TELEPHONE ENCOUNTER
Spoke with Farzana at [facility] and faxed her the requested information concerning pacemaker and spinal cord stimulator.

## 2025-03-06 ENCOUNTER — TELEPHONE (OUTPATIENT)
Dept: CARDIOLOGY CLINIC | Age: 80
End: 2025-03-06

## 2025-03-06 NOTE — TELEPHONE ENCOUNTER
Called patient she stated she has been off Eliquis last 2 weeks for back procedure, states has 1 more back procedure tomorrow and is not sure when she can resume her Eliquis but will call back once she does restart it so we can get her scheduled for her ablation.

## 2025-03-06 NOTE — TELEPHONE ENCOUNTER
Pt is getting ablation on 3/8. She is worried she has not been off of blood thinners long enough to get this done.

## 2025-03-10 ENCOUNTER — TELEPHONE (OUTPATIENT)
Dept: CARDIOLOGY CLINIC | Age: 80
End: 2025-03-10

## 2025-03-10 NOTE — TELEPHONE ENCOUNTER
Called patient, she stated has been off her Eliquis since 2/23/2025 and just restarted back on her Eliquis today as she is finished with her back procedures. Patient ready to schedule her A-fib ablation;    PPW 4/14/2025 @ 1030    A-fib ablation 4/21/2025 @ 0700    Perfect serve also sent to Dr Marcus advising with no further orders at present.

## 2025-03-11 ENCOUNTER — TELEPHONE (OUTPATIENT)
Dept: CARDIOLOGY CLINIC | Age: 80
End: 2025-03-11

## 2025-03-11 DIAGNOSIS — I48.19 PERSISTENT ATRIAL FIBRILLATION (HCC): Primary | ICD-10-CM

## 2025-03-11 NOTE — TELEPHONE ENCOUNTER
Test Ordered: Cardiac CTA   /  Insurance: [employer]  /  Authorization Status: APPROVED:  [facility]# [id number], Exp 5/17/25

## 2025-03-14 ENCOUNTER — TELEPHONE (OUTPATIENT)
Dept: CARDIOLOGY CLINIC | Age: 80
End: 2025-03-14

## 2025-03-14 NOTE — TELEPHONE ENCOUNTER
Left message with patient to provide Cardiac appointment info:  Patient is scheduled at [facility] on 3/21/2025, with an arrival time of 09:30 am and a start time of 10:00 am.       Patient will need to be NPO 4hrs prior.    Patient should bring photo id and insurance card.  Patient was advised that should the appointment need to be rescheduled, to contact Central Scheduling at [phone].

## 2025-03-17 ENCOUNTER — TELEPHONE (OUTPATIENT)
Dept: CARDIOLOGY CLINIC | Age: 80
End: 2025-03-17

## 2025-03-17 NOTE — TELEPHONE ENCOUNTER
Patient called she saw in My chart cardiac CTA  Scheduled 3/21 , she stated hse was not called  Nor did she receive a voice mail with the date &  Time , she will have to r/s she is not available  That day .

## 2025-04-01 RX ORDER — DILTIAZEM HYDROCHLORIDE 240 MG/1
240 CAPSULE, COATED, EXTENDED RELEASE ORAL DAILY
Qty: 90 CAPSULE | Refills: 3 | Status: SHIPPED | OUTPATIENT
Start: 2025-04-01

## 2025-04-08 ENCOUNTER — HOSPITAL ENCOUNTER (OUTPATIENT)
Dept: CT IMAGING | Age: 80
Discharge: HOME | End: 2025-04-08
Attending: INTERNAL MEDICINE
Payer: MEDICARE

## 2025-04-08 DIAGNOSIS — I48.19 PERSISTENT ATRIAL FIBRILLATION (HCC): ICD-10-CM

## 2025-04-08 PROCEDURE — 75574 CT ANGIO HRT W/3D IMAGE: CPT

## 2025-04-08 PROCEDURE — 6360000004 HC RX CONTRAST MEDICATION: Performed by: INTERNAL MEDICINE

## 2025-04-08 PROCEDURE — 75574 CT ANGIO HRT W/3D IMAGE: CPT | Performed by: INTERNAL MEDICINE

## 2025-04-08 RX ORDER — IOPAMIDOL 755 MG/ML
120 INJECTION, SOLUTION INTRAVASCULAR
Status: COMPLETED | OUTPATIENT
Start: 2025-04-08 | End: 2025-04-08

## 2025-04-08 RX ADMIN — IOPAMIDOL 120 ML: 755 INJECTION, SOLUTION INTRAVENOUS at 09:38

## 2025-04-14 ENCOUNTER — HOSPITAL ENCOUNTER (OUTPATIENT)
Dept: GENERAL RADIOLOGY | Age: 80
Discharge: HOME | End: 2025-04-14
Payer: MEDICARE

## 2025-04-14 ENCOUNTER — HOSPITAL ENCOUNTER (OUTPATIENT)
Age: 80
Discharge: HOME | End: 2025-04-14
Payer: MEDICARE

## 2025-04-14 ENCOUNTER — CLINICAL SUPPORT (OUTPATIENT)
Age: 80
End: 2025-04-14

## 2025-04-14 DIAGNOSIS — I48.0 PAF (PAROXYSMAL ATRIAL FIBRILLATION) (HCC): Primary | ICD-10-CM

## 2025-04-14 DIAGNOSIS — I48.19 PERSISTENT ATRIAL FIBRILLATION (HCC): ICD-10-CM

## 2025-04-14 LAB
ANION GAP SERPL CALCULATED.3IONS-SCNC: 12 MMOL/L (ref 9–17)
BUN SERPL-MCNC: 21 MG/DL (ref 7–20)
CALCIUM SERPL-MCNC: 9.2 MG/DL (ref 8.3–10.6)
CHLORIDE SERPL-SCNC: 107 MMOL/L (ref 99–110)
CO2 SERPL-SCNC: 24 MMOL/L (ref 21–32)
CREAT SERPL-MCNC: 0.9 MG/DL (ref 0.6–1.2)
ERYTHROCYTE [DISTWIDTH] IN BLOOD BY AUTOMATED COUNT: 12.1 % (ref 11.7–14.9)
GFR, ESTIMATED: 62 ML/MIN/1.73M2
GLUCOSE SERPL-MCNC: 94 MG/DL (ref 74–99)
HCT VFR BLD AUTO: 41.4 % (ref 37–47)
HGB BLD-MCNC: 13.5 G/DL (ref 12.5–16)
INR PPP: 1.3
MAGNESIUM SERPL-MCNC: 2.4 MG/DL (ref 1.8–2.4)
MCH RBC QN AUTO: 30.9 PG (ref 27–31)
MCHC RBC AUTO-ENTMCNC: 32.6 G/DL (ref 32–36)
MCV RBC AUTO: 94.7 FL (ref 78–100)
PARTIAL THROMBOPLASTIN TIME: 32.1 SEC (ref 25.1–37.1)
PHOSPHATE SERPL-MCNC: 3.7 MG/DL (ref 2.5–4.9)
PLATELET # BLD AUTO: 178 K/UL (ref 140–440)
PMV BLD AUTO: 10 FL (ref 7.5–11.1)
POTASSIUM SERPL-SCNC: 4.2 MMOL/L (ref 3.5–5.1)
PROTHROMBIN TIME: 16.4 SEC (ref 11.7–14.5)
RBC # BLD AUTO: 4.37 M/UL (ref 4.2–5.4)
SARS-COV-2 RNA RESP QL NAA+PROBE: NOT DETECTED
SODIUM SERPL-SCNC: 142 MMOL/L (ref 136–145)
SPECIMEN DESCRIPTION: NORMAL
WBC OTHER # BLD: 4.2 K/UL (ref 4–10.5)

## 2025-04-14 PROCEDURE — 84100 ASSAY OF PHOSPHORUS: CPT

## 2025-04-14 PROCEDURE — 85610 PROTHROMBIN TIME: CPT

## 2025-04-14 PROCEDURE — 80048 BASIC METABOLIC PNL TOTAL CA: CPT

## 2025-04-14 PROCEDURE — 83735 ASSAY OF MAGNESIUM: CPT

## 2025-04-14 PROCEDURE — 85730 THROMBOPLASTIN TIME PARTIAL: CPT

## 2025-04-14 PROCEDURE — 87635 SARS-COV-2 COVID-19 AMP PRB: CPT

## 2025-04-14 PROCEDURE — 71046 X-RAY EXAM CHEST 2 VIEWS: CPT

## 2025-04-14 PROCEDURE — 85027 COMPLETE CBC AUTOMATED: CPT

## 2025-04-14 NOTE — PROGRESS NOTES
Patient here in office and educated on 04/14/25, scheduled for Atrial Fibrillation Ablation with Transesophageal Echocardiogram on 04/21/25 @ 0700, with arrival @ 0530, @ [facility]; consents signed. Copy of orders given for labs, COVID and CXR due 04/14/25 at [facility]. Instructions given to patient to :NPO after midnight including water the night before procedure. May take rest of morning medications day of procedure except the following;  Hold Eliquis the morning of the procedure. Hold ALL blood pressure medications morning of procedure. Advised patient to plan for an overnight stay in the hospital. Patient voiced understanding. Copies of consent & info scanned in chart.

## 2025-04-17 ENCOUNTER — ANESTHESIA EVENT (OUTPATIENT)
Age: 80
End: 2025-04-17
Payer: MEDICARE

## 2025-04-21 ENCOUNTER — ANESTHESIA (OUTPATIENT)
Age: 80
End: 2025-04-21
Payer: MEDICARE

## 2025-04-21 ENCOUNTER — APPOINTMENT (OUTPATIENT)
Dept: GENERAL RADIOLOGY | Age: 80
End: 2025-04-21
Attending: INTERNAL MEDICINE
Payer: MEDICARE

## 2025-04-21 ENCOUNTER — APPOINTMENT (OUTPATIENT)
Dept: NON INVASIVE DIAGNOSTICS | Age: 80
End: 2025-04-21
Attending: INTERNAL MEDICINE
Payer: MEDICARE

## 2025-04-21 ENCOUNTER — HOSPITAL ENCOUNTER (OUTPATIENT)
Age: 80
Setting detail: OUTPATIENT SURGERY
Discharge: HOME | End: 2025-04-21
Attending: INTERNAL MEDICINE | Admitting: INTERNAL MEDICINE
Payer: MEDICARE

## 2025-04-21 VITALS
BODY MASS INDEX: 27.82 KG/M2 | WEIGHT: 167 LBS | SYSTOLIC BLOOD PRESSURE: 113 MMHG | RESPIRATION RATE: 18 BRPM | HEIGHT: 65 IN | HEART RATE: 72 BPM | OXYGEN SATURATION: 97 % | DIASTOLIC BLOOD PRESSURE: 60 MMHG | TEMPERATURE: 97 F

## 2025-04-21 DIAGNOSIS — Z48.89 ENCOUNTER FOR OTHER SPECIFIED SURGICAL AFTERCARE: Primary | ICD-10-CM

## 2025-04-21 DIAGNOSIS — I48.19 PERSISTENT ATRIAL FIBRILLATION (HCC): ICD-10-CM

## 2025-04-21 LAB
ABO + RH BLD: NORMAL
ACTIVATED CLOTTING TIME, LOW RANGE: 161 SEC (ref 89–169)
ACTIVATED CLOTTING TIME, LOW RANGE: 306 SEC (ref 89–169)
ACTIVATED CLOTTING TIME, LOW RANGE: 352 SEC (ref 89–169)
BLOOD BANK SAMPLE EXPIRATION: NORMAL
BLOOD GROUP ANTIBODIES SERPL: NEGATIVE
ECHO BSA: 1.86 M2
ECHO EST RA PRESSURE: 3 MMHG
ECHO IVC PROX: 1.4 CM
ECHO LV EDV A4C: 44 ML
ECHO LV EDV INDEX A4C: 24 ML/M2
ECHO LV EF PHYSICIAN: 60 %
ECHO LV EJECTION FRACTION A4C: 56 %
ECHO LV ESV A4C: 19 ML
ECHO LV ESV INDEX A4C: 10 ML/M2
ECHO LV FRACTIONAL SHORTENING: 40 % (ref 28–44)
ECHO LV INTERNAL DIMENSION DIASTOLE INDEX: 2.46 CM/M2
ECHO LV INTERNAL DIMENSION DIASTOLIC: 4.5 CM (ref 3.9–5.3)
ECHO LV INTERNAL DIMENSION SYSTOLIC INDEX: 1.48 CM/M2
ECHO LV INTERNAL DIMENSION SYSTOLIC: 2.7 CM
ECHO LV IVSD: 1.1 CM (ref 0.6–0.9)
ECHO LV MASS 2D: 164 G (ref 67–162)
ECHO LV MASS INDEX 2D: 89.6 G/M2 (ref 43–95)
ECHO LV POSTERIOR WALL DIASTOLIC: 1 CM (ref 0.6–0.9)
ECHO LV RELATIVE WALL THICKNESS RATIO: 0.44
ECHO RIGHT VENTRICULAR SYSTOLIC PRESSURE (RVSP): 35 MMHG
ECHO RV MID DIMENSION: 3.9 CM
ECHO TV REGURGITANT MAX VELOCITY: 2.84 M/S
ECHO TV REGURGITANT PEAK GRADIENT: 32 MMHG

## 2025-04-21 PROCEDURE — 93325 DOPPLER ECHO COLOR FLOW MAPG: CPT | Performed by: INTERNAL MEDICINE

## 2025-04-21 PROCEDURE — 93656 COMPRE EP EVAL ABLTJ ATR FIB: CPT | Performed by: INTERNAL MEDICINE

## 2025-04-21 PROCEDURE — C1894 INTRO/SHEATH, NON-LASER: HCPCS | Performed by: INTERNAL MEDICINE

## 2025-04-21 PROCEDURE — C1732 CATH, EP, DIAG/ABL, 3D/VECT: HCPCS | Performed by: INTERNAL MEDICINE

## 2025-04-21 PROCEDURE — C1769 GUIDE WIRE: HCPCS | Performed by: INTERNAL MEDICINE

## 2025-04-21 PROCEDURE — C1733 CATH, EP, OTHR THAN COOL-TIP: HCPCS | Performed by: INTERNAL MEDICINE

## 2025-04-21 PROCEDURE — 7100000000 HC PACU RECOVERY - FIRST 15 MIN: Performed by: INTERNAL MEDICINE

## 2025-04-21 PROCEDURE — 93623 PRGRMD STIMJ&PACG IV RX NFS: CPT | Performed by: INTERNAL MEDICINE

## 2025-04-21 PROCEDURE — 86901 BLOOD TYPING SEROLOGIC RH(D): CPT

## 2025-04-21 PROCEDURE — 93005 ELECTROCARDIOGRAM TRACING: CPT | Performed by: INTERNAL MEDICINE

## 2025-04-21 PROCEDURE — 7100000001 HC PACU RECOVERY - ADDTL 15 MIN: Performed by: INTERNAL MEDICINE

## 2025-04-21 PROCEDURE — C1730 CATH, EP, 19 OR FEW ELECT: HCPCS | Performed by: INTERNAL MEDICINE

## 2025-04-21 PROCEDURE — 3700000001 HC ADD 15 MINUTES (ANESTHESIA): Performed by: INTERNAL MEDICINE

## 2025-04-21 PROCEDURE — 93320 DOPPLER ECHO COMPLETE: CPT | Performed by: INTERNAL MEDICINE

## 2025-04-21 PROCEDURE — 2500000003 HC RX 250 WO HCPCS: Performed by: NURSE ANESTHETIST, CERTIFIED REGISTERED

## 2025-04-21 PROCEDURE — 93308 TTE F-UP OR LMTD: CPT

## 2025-04-21 PROCEDURE — 2500000003 HC RX 250 WO HCPCS

## 2025-04-21 PROCEDURE — 2720000010 HC SURG SUPPLY STERILE: Performed by: INTERNAL MEDICINE

## 2025-04-21 PROCEDURE — 93655 ICAR CATH ABLTJ DSCRT ARRHYT: CPT | Performed by: INTERNAL MEDICINE

## 2025-04-21 PROCEDURE — 2580000003 HC RX 258: Performed by: NURSE ANESTHETIST, CERTIFIED REGISTERED

## 2025-04-21 PROCEDURE — 6360000002 HC RX W HCPCS: Performed by: NURSE ANESTHETIST, CERTIFIED REGISTERED

## 2025-04-21 PROCEDURE — 93325 DOPPLER ECHO COLOR FLOW MAPG: CPT

## 2025-04-21 PROCEDURE — 86900 BLOOD TYPING SEROLOGIC ABO: CPT

## 2025-04-21 PROCEDURE — C1893 INTRO/SHEATH, FIXED,NON-PEEL: HCPCS | Performed by: INTERNAL MEDICINE

## 2025-04-21 PROCEDURE — 7100000011 HC PHASE II RECOVERY - ADDTL 15 MIN: Performed by: INTERNAL MEDICINE

## 2025-04-21 PROCEDURE — 2709999900 HC NON-CHARGEABLE SUPPLY: Performed by: INTERNAL MEDICINE

## 2025-04-21 PROCEDURE — 6360000002 HC RX W HCPCS: Performed by: INTERNAL MEDICINE

## 2025-04-21 PROCEDURE — 71045 X-RAY EXAM CHEST 1 VIEW: CPT

## 2025-04-21 PROCEDURE — 86850 RBC ANTIBODY SCREEN: CPT

## 2025-04-21 PROCEDURE — 85347 COAGULATION TIME ACTIVATED: CPT

## 2025-04-21 PROCEDURE — 93312 ECHO TRANSESOPHAGEAL: CPT | Performed by: INTERNAL MEDICINE

## 2025-04-21 PROCEDURE — 7100000010 HC PHASE II RECOVERY - FIRST 15 MIN: Performed by: INTERNAL MEDICINE

## 2025-04-21 PROCEDURE — C1759 CATH, INTRA ECHOCARDIOGRAPHY: HCPCS | Performed by: INTERNAL MEDICINE

## 2025-04-21 PROCEDURE — 6360000002 HC RX W HCPCS

## 2025-04-21 PROCEDURE — 93653 COMPRE EP EVAL TX SVT: CPT | Performed by: INTERNAL MEDICINE

## 2025-04-21 PROCEDURE — 3700000000 HC ANESTHESIA ATTENDED CARE: Performed by: INTERNAL MEDICINE

## 2025-04-21 RX ORDER — ADENOSINE 3 MG/ML
INJECTION, SOLUTION INTRAVENOUS PRN
Status: DISCONTINUED | OUTPATIENT
Start: 2025-04-21 | End: 2025-04-21 | Stop reason: HOSPADM

## 2025-04-21 RX ORDER — SODIUM CHLORIDE 0.9 % (FLUSH) 0.9 %
5-40 SYRINGE (ML) INJECTION EVERY 12 HOURS SCHEDULED
Status: DISCONTINUED | OUTPATIENT
Start: 2025-04-21 | End: 2025-04-21 | Stop reason: HOSPADM

## 2025-04-21 RX ORDER — FENTANYL CITRATE 50 UG/ML
25 INJECTION, SOLUTION INTRAMUSCULAR; INTRAVENOUS EVERY 5 MIN PRN
Status: DISCONTINUED | OUTPATIENT
Start: 2025-04-21 | End: 2025-04-21 | Stop reason: HOSPADM

## 2025-04-21 RX ORDER — PROPOFOL 10 MG/ML
INJECTION, EMULSION INTRAVENOUS
Status: DISCONTINUED | OUTPATIENT
Start: 2025-04-21 | End: 2025-04-21 | Stop reason: SDUPTHER

## 2025-04-21 RX ORDER — SODIUM CHLORIDE 9 MG/ML
INJECTION, SOLUTION INTRAVENOUS PRN
Status: DISCONTINUED | OUTPATIENT
Start: 2025-04-21 | End: 2025-04-21 | Stop reason: HOSPADM

## 2025-04-21 RX ORDER — LIDOCAINE HYDROCHLORIDE 20 MG/ML
INJECTION, SOLUTION INFILTRATION; PERINEURAL
Status: DISCONTINUED | OUTPATIENT
Start: 2025-04-21 | End: 2025-04-21 | Stop reason: SDUPTHER

## 2025-04-21 RX ORDER — FENTANYL CITRATE 50 UG/ML
INJECTION, SOLUTION INTRAMUSCULAR; INTRAVENOUS
Status: DISCONTINUED | OUTPATIENT
Start: 2025-04-21 | End: 2025-04-21 | Stop reason: SDUPTHER

## 2025-04-21 RX ORDER — DEXAMETHASONE SODIUM PHOSPHATE 4 MG/ML
INJECTION, SOLUTION INTRA-ARTICULAR; INTRALESIONAL; INTRAMUSCULAR; INTRAVENOUS; SOFT TISSUE
Status: DISCONTINUED | OUTPATIENT
Start: 2025-04-21 | End: 2025-04-21 | Stop reason: SDUPTHER

## 2025-04-21 RX ORDER — SODIUM CHLORIDE 0.9 % (FLUSH) 0.9 %
5-40 SYRINGE (ML) INJECTION PRN
Status: DISCONTINUED | OUTPATIENT
Start: 2025-04-21 | End: 2025-04-21 | Stop reason: HOSPADM

## 2025-04-21 RX ORDER — NALOXONE HYDROCHLORIDE 0.4 MG/ML
INJECTION, SOLUTION INTRAMUSCULAR; INTRAVENOUS; SUBCUTANEOUS PRN
Status: DISCONTINUED | OUTPATIENT
Start: 2025-04-21 | End: 2025-04-21 | Stop reason: HOSPADM

## 2025-04-21 RX ORDER — PROTAMINE SULFATE 10 MG/ML
INJECTION, SOLUTION INTRAVENOUS
Status: DISCONTINUED | OUTPATIENT
Start: 2025-04-21 | End: 2025-04-21 | Stop reason: SDUPTHER

## 2025-04-21 RX ORDER — METOCLOPRAMIDE HYDROCHLORIDE 5 MG/ML
10 INJECTION INTRAMUSCULAR; INTRAVENOUS
Status: DISCONTINUED | OUTPATIENT
Start: 2025-04-21 | End: 2025-04-21 | Stop reason: HOSPADM

## 2025-04-21 RX ORDER — OXYCODONE HYDROCHLORIDE 5 MG/1
10 TABLET ORAL PRN
Status: DISCONTINUED | OUTPATIENT
Start: 2025-04-21 | End: 2025-04-21 | Stop reason: HOSPADM

## 2025-04-21 RX ORDER — HEPARIN SODIUM 10000 [USP'U]/ML
INJECTION, SOLUTION INTRAVENOUS; SUBCUTANEOUS PRN
Status: DISCONTINUED | OUTPATIENT
Start: 2025-04-21 | End: 2025-04-21 | Stop reason: HOSPADM

## 2025-04-21 RX ORDER — ONDANSETRON 2 MG/ML
INJECTION INTRAMUSCULAR; INTRAVENOUS
Status: DISCONTINUED | OUTPATIENT
Start: 2025-04-21 | End: 2025-04-21 | Stop reason: SDUPTHER

## 2025-04-21 RX ORDER — SODIUM CHLORIDE, SODIUM LACTATE, POTASSIUM CHLORIDE, CALCIUM CHLORIDE 600; 310; 30; 20 MG/100ML; MG/100ML; MG/100ML; MG/100ML
INJECTION, SOLUTION INTRAVENOUS
Status: DISCONTINUED | OUTPATIENT
Start: 2025-04-21 | End: 2025-04-21 | Stop reason: SDUPTHER

## 2025-04-21 RX ORDER — ONDANSETRON 2 MG/ML
4 INJECTION INTRAMUSCULAR; INTRAVENOUS
Status: DISCONTINUED | OUTPATIENT
Start: 2025-04-21 | End: 2025-04-21 | Stop reason: HOSPADM

## 2025-04-21 RX ORDER — ROCURONIUM BROMIDE 10 MG/ML
INJECTION, SOLUTION INTRAVENOUS
Status: DISCONTINUED | OUTPATIENT
Start: 2025-04-21 | End: 2025-04-21 | Stop reason: SDUPTHER

## 2025-04-21 RX ORDER — LABETALOL HYDROCHLORIDE 5 MG/ML
10 INJECTION, SOLUTION INTRAVENOUS
Status: DISCONTINUED | OUTPATIENT
Start: 2025-04-21 | End: 2025-04-21 | Stop reason: HOSPADM

## 2025-04-21 RX ORDER — OXYCODONE HYDROCHLORIDE 5 MG/1
5 TABLET ORAL PRN
Status: DISCONTINUED | OUTPATIENT
Start: 2025-04-21 | End: 2025-04-21 | Stop reason: HOSPADM

## 2025-04-21 RX ADMIN — FENTANYL CITRATE 25 MCG: 50 INJECTION, SOLUTION INTRAMUSCULAR; INTRAVENOUS at 09:35

## 2025-04-21 RX ADMIN — LIDOCAINE HYDROCHLORIDE 100 MG: 20 INJECTION, SOLUTION INFILTRATION; PERINEURAL at 07:31

## 2025-04-21 RX ADMIN — PROTAMINE SULFATE 30 MG: 10 INJECTION, SOLUTION INTRAVENOUS at 09:30

## 2025-04-21 RX ADMIN — FENTANYL CITRATE 25 MCG: 50 INJECTION, SOLUTION INTRAMUSCULAR; INTRAVENOUS at 09:41

## 2025-04-21 RX ADMIN — ROCURONIUM BROMIDE 50 MG: 10 INJECTION INTRAVENOUS at 07:32

## 2025-04-21 RX ADMIN — SODIUM CHLORIDE, SODIUM LACTATE, POTASSIUM CHLORIDE, CALCIUM CHLORIDE: 600; 310; 30; 20 INJECTION, SOLUTION INTRAVENOUS at 07:30

## 2025-04-21 RX ADMIN — ONDANSETRON 4 MG: 2 INJECTION INTRAMUSCULAR; INTRAVENOUS at 09:33

## 2025-04-21 RX ADMIN — ROCURONIUM BROMIDE 20 MG: 10 INJECTION INTRAVENOUS at 09:06

## 2025-04-21 RX ADMIN — SUGAMMADEX 200 MG: 100 INJECTION, SOLUTION INTRAVENOUS at 09:33

## 2025-04-21 RX ADMIN — PHENYLEPHRINE HYDROCHLORIDE 20 MCG/MIN: 10 INJECTION INTRAVENOUS at 07:39

## 2025-04-21 RX ADMIN — DEXAMETHASONE SODIUM PHOSPHATE 8 MG: 4 INJECTION, SOLUTION INTRAMUSCULAR; INTRAVENOUS at 07:36

## 2025-04-21 RX ADMIN — ROCURONIUM BROMIDE 30 MG: 10 INJECTION INTRAVENOUS at 07:55

## 2025-04-21 RX ADMIN — FENTANYL CITRATE 50 MCG: 50 INJECTION, SOLUTION INTRAMUSCULAR; INTRAVENOUS at 07:31

## 2025-04-21 RX ADMIN — PROPOFOL 120 MG: 10 INJECTION, EMULSION INTRAVENOUS at 07:31

## 2025-04-21 ASSESSMENT — ENCOUNTER SYMPTOMS
ABDOMINAL PAIN: 0
BLOOD IN STOOL: 0
SHORTNESS OF BREATH: 1
BACK PAIN: 1
CHEST TIGHTNESS: 0
NAUSEA: 0
TROUBLE SWALLOWING: 0

## 2025-04-21 NOTE — PLAN OF CARE
Recieved from PACU. Monitor and alarms on. Bilateral groin sites assessed with PACU CARO Persaud. No swelling, bleeding, hematoma, or drainage noted. Figure 8 sutures removed by CARO Galan. Pt on monitor. Pt is very drowsy but responds to verbal stimulation, is oriented x 4, follows commands, denies complaints at this time. Skin warm and dry. Pt states sedation medication makes her very drowsy. Call light within reach.

## 2025-04-21 NOTE — DISCHARGE INSTRUCTIONS
Discharge Home Instuctions  Name:Carmela Grove  :1945    Your instructions:    Shower only for the first 5 days, NO TUB BATHS.      Wash procedure site with mild soap, rinse and pat dry.  Do not rub over the procedure site for two (2) days.  Remove dressing and replace with a band-aid in 2 days.    Site observations-Observe the area for bleeding or hematoma (lump under the skin caused by bleeding.)      A.  Painless bruising is normal.  B.  If a firmness/swelling seems to be increasing or there is bright red bleeding from site       (hold pressure over procedure site) and  CALL 911 IMMEDIATELY.    What to do after you leave the hospital:    Recommended activity: no lifting, Driving, or Strenuous exercise for 3 days.  DO NOT lift more than 10lbs.    For your procedure you may have been given a sedative to help you relax. This drug will make you sleepy. It is usually given in a vein (by IV).  Don't do anything for 24 hours that requires attention to detail. It takes time for the medicine effects to completely wear off.  Do not sign any legally binding contracts or documents over the next 24 hours.  For your safety, you should not drive or operate any machinery that could be dangerous until the medicine wears off and you can think clearly and react easily.    Follow-up with physician in 7-10 days.    Take your medication as ordered.      If you have any questions, you may call [phone] or your physicians office

## 2025-04-21 NOTE — ANESTHESIA PRE PROCEDURE
Department of Anesthesiology  Preprocedure Note       Name:  Carmela Grove   Age:  79 y.o.  :  1945                                          MRN:  [id number]         Date:  2025      Surgeon: Surgeon(s):  Noble Marcus MD    Procedure: Procedure(s):  Ablation A-fib w complete ep study @ 0700    Medications prior to admission:   Prior to Admission medications    Medication Sig Start Date End Date Taking? Authorizing Provider   Cyanocobalamin (VITAMIN B-12) 2500 MCG SUBL Vitamin B12   Yes Jace Murcia MD   dilTIAZem (CARDIZEM CD) 240 MG extended release capsule Take 1 capsule by mouth daily 25  Yes Karla Bach MD   clopidogrel (PLAVIX) 75 MG tablet Take 1 tablet by mouth daily 24  Yes Karla Bach MD   amiodarone (CORDARONE) 200 MG tablet Take 1 tablet by mouth daily 24  Yes Karla Bach MD   aspirin 81 MG EC tablet Take 1 tablet by mouth daily 24  Yes Karla Bach MD   Handicap Placard MISC by Does not apply route Start 2024  End 2029  Yes Reny Sylvester APRN - CNP   apixaban (ELIQUIS) 5 MG TABS tablet Take 1 tablet by mouth 2 times daily 24  Yes Ernestine Yang APRN - CNP   Zolpidem Tartrate (AMBIEN PO) Take by mouth   Yes ProviderJace MD   MAGNESIUM CITRATE PO Take 250 mg by mouth 2 times daily   Yes ProviderJace MD   donepezil (ARICEPT) 5 MG tablet Take 1 tablet by mouth nightly   Yes ProviderJace MD   Cyanocobalamin (VITAMIN B-12 PO) Take 2,500 mcg by mouth   Yes ProviderJace MD   Calcium Carbonate-Vitamin D (CALCIUM 500/D PO) Take 500 mg by mouth 2 times daily   Yes ProviderJace MD   atorvastatin (LIPITOR) 40 MG tablet Take 1 tablet by mouth daily   Yes Jace Murcia MD   furosemide (LASIX) 20 MG tablet Take 1 tablet by mouth daily for 5 days  Patient not taking: Reported on 2024 7/29/24 8/3/24  Seager, Christopher K, DO   potassium chloride (KLOR-CON M) 20 MEQ extended 
  Cyanocobalamin (VITAMIN B-12 PO) Take 2,500 mcg by mouth    Jace Murcia MD   montelukast (SINGULAIR) 10 MG tablet Take 1 tablet by mouth nightly  Patient not taking: Reported on 4/8/2025    Jace Murcia MD   Calcium Carbonate-Vitamin D (CALCIUM 500/D PO) Take 500 mg by mouth 2 times daily    Jace Murcia MD   atorvastatin (LIPITOR) 40 MG tablet Take 1 tablet by mouth daily    Jace Murcia MD       Current medications:    No current facility-administered medications for this encounter.     Current Outpatient Medications   Medication Sig Dispense Refill    Cyanocobalamin (VITAMIN B-12) 2500 MCG SUBL Vitamin B12      dilTIAZem (DILT-XR) 240 MG extended release capsule oral      dilTIAZem (CARDIZEM CD) 240 MG extended release capsule Take 1 capsule by mouth daily 90 capsule 3    apixaban (ELIQUIS) 5 MG TABS tablet Take 1 tablet by mouth 2 times daily 180 tablet 3    clopidogrel (PLAVIX) 75 MG tablet Take 1 tablet by mouth daily 90 tablet 3    amiodarone (CORDARONE) 200 MG tablet Take 1 tablet by mouth daily 90 tablet 3    aspirin 81 MG EC tablet Take 1 tablet by mouth daily 90 tablet 0    Handicap Placard MISC by Does not apply route Start 07/30/2024  End 07/30/2029 1 each 0    furosemide (LASIX) 20 MG tablet Take 1 tablet by mouth daily for 5 days (Patient not taking: Reported on 8/14/2024) 5 tablet 0    potassium chloride (KLOR-CON M) 20 MEQ extended release tablet Take 1 tablet by mouth daily for 7 days (Patient not taking: Reported on 8/14/2024) 7 tablet 0    apixaban (ELIQUIS) 5 MG TABS tablet Take 1 tablet by mouth 2 times daily 28 tablet 0    Zolpidem Tartrate (AMBIEN PO) Take by mouth      MAGNESIUM CITRATE PO Take 250 mg by mouth 2 times daily      donepezil (ARICEPT) 5 MG tablet Take 1 tablet by mouth nightly      Cyanocobalamin (VITAMIN B-12 PO) Take 2,500 mcg by mouth      montelukast (SINGULAIR) 10 MG tablet Take 1 tablet by mouth nightly (Patient not taking: Reported

## 2025-04-21 NOTE — ANESTHESIA POSTPROCEDURE EVALUATION
Department of Anesthesiology  Postprocedure Note    Patient: Carmela Grove  MRN: [id number]  YOB: 1945  Date of evaluation: 4/21/2025    Procedure Summary       Date: 04/21/25 Room / Location: [facility] CATH LAB 2 / [facility] CARDIAC CATH LAB    Anesthesia Start: 0714 Anesthesia Stop: 1004    Procedure: Ablation A-fib w complete ep study @ 0700 Diagnosis:       Atrial fibrillation and flutter      (Persistent atrial fibrillation (HCC) [I48.19])    Providers: Noble Marcus MD Responsible Provider: Otf De La Fuente MD    Anesthesia Type: general ASA Status: 3            Anesthesia Type: No value filed.    Halley Phase I: Halley Score: 10    Halley Phase II:      Anesthesia Post Evaluation    Patient location during evaluation: bedside  Patient participation: complete - patient participated  Level of consciousness: awake  Airway patency: patent  Nausea & Vomiting: no nausea  Cardiovascular status: blood pressure returned to baseline  Respiratory status: acceptable  Hydration status: euvolemic  Pain management: adequate    There were no known notable events for this encounter.

## 2025-04-21 NOTE — PROGRESS NOTES
Pt ambulated to br and voided returned to room bernardo groin drsg dry and intact no hematoma.  Dc instructions reviewed with pt and family, both verbalizes understanding.  Pt changed into clothes

## 2025-04-21 NOTE — H&P
Electrophysiology H&p  Note      Chief complaint : here for atrial fib ablation     Referring physician:       Primary care physician: Barrie Lo MD      History of Present Illness:     Today visit (04/21/25)    Patient here for Atrial fibrillation ablation.  Patient now sp back surgeries and no other surgeries planned electively  Patient has still atrial fibrillation and lot of fatigue and tiredness, shortness of breath with exertion still present. Here for ablation  . No change in H&P noted from previous clinic visit.     Previous visit:     Patient is here to discuss about atrial fib management  Patient feels tiredness and fatigue  Denies chest pain  Has shortness of breath with exertion but does not exert much  She is going for spine surgery in [facility]  Patient denies palpitations, dizziness or syncope  Patient does not drink or smoke      Previous visit:  (2/10/2022)      Chief Complaint   Patient presents with    6 Month Follow-Up     pt denies any cardiac symptoms. pt drinks 1 cup of coffee daily. pt does not drink or smoke. pt states decreased excercise           Previous visit:(7/31/2020)      Chief Complaint   Patient presents with    6 Month Follow-Up     patient states is feeling good. Did report a few episodes last month of feeling lightheaded and became hot and mildly sweaty. Denies CP, SOB. palpitations. No edema. Hx pacemaker, PAF.    Atrial Fibrillation           Previous visit: (7/10/2019)      Chief Complaint   Patient presents with    1 Month Follow-Up     Mikala/Anibal-1 mo f/u-cardizem decreased PPM 3/30.  Patient reports she was feeling good for last 2 weeks and today she feels some haziness. No dizziness  Patient BP has been normal range so she stopped taking midodrine.  No chest pain or shortness of breath    Dizziness           Previous visit:    Chief Complaint   Patient presents with         Patient of Dr. Bach, here

## 2025-04-22 ENCOUNTER — TELEPHONE (OUTPATIENT)
Dept: CARDIOLOGY CLINIC | Age: 80
End: 2025-04-22

## 2025-04-22 LAB
EKG ATRIAL RATE: 66 BPM
EKG DIAGNOSIS: NORMAL
EKG P AXIS: 75 DEGREES
EKG P-R INTERVAL: 180 MS
EKG Q-T INTERVAL: 494 MS
EKG QRS DURATION: 140 MS
EKG QTC CALCULATION (BAZETT): 517 MS
EKG R AXIS: -38 DEGREES
EKG T AXIS: -33 DEGREES
EKG VENTRICULAR RATE: 66 BPM

## 2025-04-22 PROCEDURE — 93010 ELECTROCARDIOGRAM REPORT: CPT | Performed by: INTERNAL MEDICINE

## 2025-04-22 NOTE — PROGRESS NOTES
MRN: [id number]  Name: Carmela Grove  : 1945    Insurance: Payor: [employer] /  /  /      Phone #: [phone]  Provider: Karla Bach MD     Date of Visit: 2025    Reason for visit: 6 mo  Recent Hospitalization Date:    Reason for Hospitalization:    Last EK/25  Type of Device: 3/25  Medtronic Dual Chamber Pacemaker                              CARELINK        Vitals BP HR O2% WT HT ORTHO BP LYING ORTHO BP SITTING ORTHO BP SITTING   Today's Findings           Patients work up- Check List     Testing Last Date Completed Date Expected  ([facility]) Additional Notes    MA to document For provider to complete Either MA or Provider    Carotid Duplex  STAT 1 WK 6 MTH       THIS WK 2 WK 1 YEAR     Cardiac CTA  STAT 1 WK 6 MTH       THIS WK 2 WK 1 YEAR     Cardiac CT Calcium scoring  STAT 1 WK 6 MTH       THIS WK 2 WK 1 YEAR     CTA Chest, Abdomen & Pelvis  STAT 1 WK 6 MTH       THIS WK 2 WK 1 YEAR     CT Chest IV w/ Contrast  STAT 1 WK 6 MTH       THIS WK 2 WK 1 YEAR     CT Chest w/o Contrast  STAT 1 WK 6 MTH       THIS WK 2 WK 1 YEAR     CXR  STAT 1 WK 6 MTH       THIS WK 2 WK 1 YEAR     ECHO  Stress Complete Limited     MRI- Cardiac  STAT 1 WK 6 MTH       THIS WK 2 WK 1 YEAR     MUGA Scan  STAT 1 WK 6 MTH       THIS WK 2 WK 1 YEAR     Nuclear Stress  Lexiscan Cardiolite     PFT  STAT 1 WK 6 MTH       THIS WK 2 WK 1 YEAR     Treadmill Stress Test  STAT 1 WK 6 MTH       THIS WK 2 WK 1 YEAR     Vascular Duplex  Lower: Right Left Bilat       Upper: Right Left Bilat     Other Test Not Listed:    Monitors Last Date Completed Day's Request/Ordered     Holter  Short term 24 hours 48 hours      Long term 3 days 7 days 14 days   Event   (1-30 days)      Procedures Last Date Performed Procedure Details Date Expected   Additional Notes    ASD Closure        Carotid Angio        Cardioversion        Heart Cath  R L R&L      Peripheral Angio  R L      PFO Closure        PTCA/PCI        ARYA

## 2025-04-22 NOTE — TELEPHONE ENCOUNTER
Spoke with patient and scheduled office visit follow up with Ernestine Yang NP on 5/2/25 @ 300pm.    Patient advised understanding.

## 2025-05-01 ENCOUNTER — OFFICE VISIT (OUTPATIENT)
Dept: CARDIOLOGY CLINIC | Age: 80
End: 2025-05-01
Payer: MEDICARE

## 2025-05-01 ENCOUNTER — TELEPHONE (OUTPATIENT)
Dept: CARDIOLOGY CLINIC | Age: 80
End: 2025-05-01

## 2025-05-01 VITALS
HEART RATE: 67 BPM | BODY MASS INDEX: 27.99 KG/M2 | WEIGHT: 168 LBS | DIASTOLIC BLOOD PRESSURE: 60 MMHG | SYSTOLIC BLOOD PRESSURE: 106 MMHG | HEIGHT: 65 IN | OXYGEN SATURATION: 90 %

## 2025-05-01 DIAGNOSIS — I25.10 CORONARY ARTERY DISEASE INVOLVING NATIVE CORONARY ARTERY OF NATIVE HEART WITHOUT ANGINA PECTORIS: Primary | ICD-10-CM

## 2025-05-01 DIAGNOSIS — I38 VALVULAR HEART DISEASE: ICD-10-CM

## 2025-05-01 DIAGNOSIS — E78.2 MIXED HYPERLIPIDEMIA: ICD-10-CM

## 2025-05-01 PROCEDURE — 99214 OFFICE O/P EST MOD 30 MIN: CPT | Performed by: NURSE PRACTITIONER

## 2025-05-01 PROCEDURE — G8427 DOCREV CUR MEDS BY ELIG CLIN: HCPCS | Performed by: NURSE PRACTITIONER

## 2025-05-01 PROCEDURE — G8400 PT W/DXA NO RESULTS DOC: HCPCS | Performed by: NURSE PRACTITIONER

## 2025-05-01 PROCEDURE — 1124F ACP DISCUSS-NO DSCNMKR DOCD: CPT | Performed by: NURSE PRACTITIONER

## 2025-05-01 PROCEDURE — 1159F MED LIST DOCD IN RCRD: CPT | Performed by: NURSE PRACTITIONER

## 2025-05-01 PROCEDURE — G8417 CALC BMI ABV UP PARAM F/U: HCPCS | Performed by: NURSE PRACTITIONER

## 2025-05-01 PROCEDURE — 1036F TOBACCO NON-USER: CPT | Performed by: NURSE PRACTITIONER

## 2025-05-01 PROCEDURE — 1090F PRES/ABSN URINE INCON ASSESS: CPT | Performed by: NURSE PRACTITIONER

## 2025-05-01 ASSESSMENT — ENCOUNTER SYMPTOMS
SHORTNESS OF BREATH: 0
ORTHOPNEA: 0

## 2025-05-01 NOTE — TELEPHONE ENCOUNTER
Spoke with pt and informed her to stop her aspirin per mile , pt voiced understanding and willing.

## 2025-05-01 NOTE — PROGRESS NOTES
5/1/2025  Primary cardiologist: Dr. Bach    CC:   Carmela  is an established 79 y.o.  female here for a follow up on CAD      SUBJECTIVE/OBJECTIVE:  Carmela is a 79 y.o. female with a history of coronary artery disease s/p PCI, PAF, valvular heart disease, HFpEF,  hyperlipidemia and pacemaker     HPI:  Carmela reports she had an atrial fib ablation recently with Dr Marcus. Today she feels tired. States she did not sleep well last pm. She denies palpitations. She states she has ongoing shortness of breath going up steps.    Review of Systems   Constitutional: Positive for malaise/fatigue. Negative for diaphoresis.   Cardiovascular:  Positive for dyspnea on exertion. Negative for chest pain, claudication, irregular heartbeat, leg swelling, near-syncope, orthopnea, palpitations and paroxysmal nocturnal dyspnea.   Respiratory:  Negative for shortness of breath.    Neurological:  Negative for dizziness and light-headedness.       Vitals:    05/01/25 1451   BP: 106/60   BP Site: Right Upper Arm   Patient Position: Sitting   BP Cuff Size: Medium Adult   Pulse: 67   SpO2: 90%   Weight: 76.2 kg (168 lb)   Height: 1.651 m (5' 5\")     Wt Readings from Last 3 Encounters:   05/01/25 76.2 kg (168 lb)   04/21/25 75.8 kg (167 lb)   04/17/25 75.8 kg (167 lb)      Body mass index is 27.96 kg/m².     Physical Exam  Vitals reviewed.   Eyes:      Pupils: Pupils are equal, round, and reactive to light.   Cardiovascular:      Rate and Rhythm: Normal rate and regular rhythm.      Pulses: Normal pulses.      Heart sounds: Murmur heard.   Pulmonary:      Effort: Pulmonary effort is normal.      Breath sounds: No rales.   Musculoskeletal:      Right lower leg: No edema.      Left lower leg: No edema.   Skin:     General: Skin is warm and dry.      Capillary Refill: Capillary refill takes less than 2 seconds.   Neurological:      Mental Status: She is alert and oriented to person, place, and time.                Current Outpatient Medications

## 2025-05-02 ENCOUNTER — OFFICE VISIT (OUTPATIENT)
Age: 80
End: 2025-05-02
Payer: MEDICARE

## 2025-05-02 VITALS
BODY MASS INDEX: 27.99 KG/M2 | HEIGHT: 65 IN | WEIGHT: 168 LBS | DIASTOLIC BLOOD PRESSURE: 68 MMHG | HEART RATE: 65 BPM | SYSTOLIC BLOOD PRESSURE: 116 MMHG

## 2025-05-02 DIAGNOSIS — Z98.890 STATUS POST CATHETER ABLATION OF ATRIAL FLUTTER: ICD-10-CM

## 2025-05-02 DIAGNOSIS — D68.69 HYPERCOAGULABLE STATE DUE TO PAROXYSMAL ATRIAL FIBRILLATION (HCC): ICD-10-CM

## 2025-05-02 DIAGNOSIS — I48.0 PAF (PAROXYSMAL ATRIAL FIBRILLATION) (HCC): ICD-10-CM

## 2025-05-02 DIAGNOSIS — I48.0 HYPERCOAGULABLE STATE DUE TO PAROXYSMAL ATRIAL FIBRILLATION (HCC): ICD-10-CM

## 2025-05-02 DIAGNOSIS — Z98.890 STATUS POST CATHETER ABLATION OF ATRIAL FIBRILLATION: Primary | ICD-10-CM

## 2025-05-02 PROCEDURE — 99214 OFFICE O/P EST MOD 30 MIN: CPT | Performed by: NURSE PRACTITIONER

## 2025-05-02 PROCEDURE — 93000 ELECTROCARDIOGRAM COMPLETE: CPT | Performed by: NURSE PRACTITIONER

## 2025-05-02 PROCEDURE — 1124F ACP DISCUSS-NO DSCNMKR DOCD: CPT | Performed by: NURSE PRACTITIONER

## 2025-05-02 PROCEDURE — G8417 CALC BMI ABV UP PARAM F/U: HCPCS | Performed by: NURSE PRACTITIONER

## 2025-05-02 PROCEDURE — G8427 DOCREV CUR MEDS BY ELIG CLIN: HCPCS | Performed by: NURSE PRACTITIONER

## 2025-05-02 PROCEDURE — 1036F TOBACCO NON-USER: CPT | Performed by: NURSE PRACTITIONER

## 2025-05-02 PROCEDURE — 1159F MED LIST DOCD IN RCRD: CPT | Performed by: NURSE PRACTITIONER

## 2025-05-02 PROCEDURE — 1090F PRES/ABSN URINE INCON ASSESS: CPT | Performed by: NURSE PRACTITIONER

## 2025-05-02 PROCEDURE — G8400 PT W/DXA NO RESULTS DOC: HCPCS | Performed by: NURSE PRACTITIONER

## 2025-05-02 NOTE — PROGRESS NOTES
Electrophysiology FU Note      Reason for initial consultation:  Palpitations , need for pacemaker    Chief complaint :  follow up fibrillation    Referring physician:       Primary care physician: Barrie Lo MD      History of Present Illness:     This visit 5/2/2025  Patient here today for follow-up on ablation of atrial fibrillation and atrial flutter.  Patient had procedure 1 week ago.  She reports she feels well.  She denies chest pain, palpitations, shortness of breath, lightheadedness, dizziness, edema or syncope.    Previous visit 4/30/2024  Patient is here today for follow up on atrial fibrillation. Patient reports she has be feeling well. She states that she does not feel palpitations. She denies chest pain, shortness of breath, lightheadedness, dizziness, edema or syncope.     Previous visit 10/31/2023  Patient is here for follow up on atrial fibrillation. She reports she is feeling well. She denies chest pain,  palpitations, shortness of breath, edema, dizziness, or syncope.    She stats she has not had any further episodes of palpitations.     Previous visit 8/30/2023  Patient is here today for follow up on atrial fibrillation. She states she had an episode of palpitations and heart pounding in her chest a few weeks ago. She states these symptoms woke her up from sleep. She states that they symptoms resolved on own. She denies aggravating of alleviating factors. She denies chest pain, shortness of breath, lightheadedness, dizziness, edema, or syncope.     Previous visit 2/15/2023  Patient here today for follow-up on atrial fibrillation.  Patient reports she feels well.  She denies chest pain, palpitations, shortness of breath, lightheadedness, dizziness, edema or syncope.  Patient reports she recently saw her neurologist and she needs to have an echo and a carotid ultrasound.    Previous visit 8/11/2022  Patient here today for 6-month

## 2025-06-03 ENCOUNTER — OFFICE VISIT (OUTPATIENT)
Age: 80
End: 2025-06-03
Payer: MEDICARE

## 2025-06-03 VITALS
WEIGHT: 169.4 LBS | HEART RATE: 68 BPM | BODY MASS INDEX: 28.22 KG/M2 | HEIGHT: 65 IN | DIASTOLIC BLOOD PRESSURE: 70 MMHG | SYSTOLIC BLOOD PRESSURE: 124 MMHG

## 2025-06-03 DIAGNOSIS — Z98.890 STATUS POST CATHETER ABLATION OF ATRIAL FIBRILLATION: Primary | ICD-10-CM

## 2025-06-03 DIAGNOSIS — D68.69 HYPERCOAGULABLE STATE DUE TO PAROXYSMAL ATRIAL FIBRILLATION (HCC): ICD-10-CM

## 2025-06-03 DIAGNOSIS — Z98.890 STATUS POST CATHETER ABLATION OF ATRIAL FLUTTER: ICD-10-CM

## 2025-06-03 DIAGNOSIS — I48.0 HYPERCOAGULABLE STATE DUE TO PAROXYSMAL ATRIAL FIBRILLATION (HCC): ICD-10-CM

## 2025-06-03 PROCEDURE — G8427 DOCREV CUR MEDS BY ELIG CLIN: HCPCS | Performed by: NURSE PRACTITIONER

## 2025-06-03 PROCEDURE — G8417 CALC BMI ABV UP PARAM F/U: HCPCS | Performed by: NURSE PRACTITIONER

## 2025-06-03 PROCEDURE — G8400 PT W/DXA NO RESULTS DOC: HCPCS | Performed by: NURSE PRACTITIONER

## 2025-06-03 PROCEDURE — 1159F MED LIST DOCD IN RCRD: CPT | Performed by: NURSE PRACTITIONER

## 2025-06-03 PROCEDURE — 1036F TOBACCO NON-USER: CPT | Performed by: NURSE PRACTITIONER

## 2025-06-03 PROCEDURE — 1124F ACP DISCUSS-NO DSCNMKR DOCD: CPT | Performed by: NURSE PRACTITIONER

## 2025-06-03 PROCEDURE — 1090F PRES/ABSN URINE INCON ASSESS: CPT | Performed by: NURSE PRACTITIONER

## 2025-06-03 PROCEDURE — 93000 ELECTROCARDIOGRAM COMPLETE: CPT | Performed by: NURSE PRACTITIONER

## 2025-06-03 PROCEDURE — 99214 OFFICE O/P EST MOD 30 MIN: CPT | Performed by: NURSE PRACTITIONER

## 2025-06-03 NOTE — PROGRESS NOTES
0    apixaban (ELIQUIS) 5 MG TABS tablet Take 1 tablet by mouth 2 times daily 28 tablet 0    Zolpidem Tartrate (AMBIEN PO) Take by mouth      MAGNESIUM CITRATE PO Take 250 mg by mouth 2 times daily      donepezil (ARICEPT) 5 MG tablet Take 1 tablet by mouth nightly      Calcium Carbonate-Vitamin D (CALCIUM 500/D PO) Take 500 mg by mouth 2 times daily      atorvastatin (LIPITOR) 40 MG tablet Take 1 tablet by mouth daily      montelukast (SINGULAIR) 10 MG tablet Take 1 tablet by mouth nightly       No current facility-administered medications on file prior to visit.       Review of Systems:   Review of Systems   Constitutional: Negative for activity change, chills, fatigue and fever.   HENT: Negative for congestion, ear pain and tinnitus.    Eyes: Negative for photophobia, pain and visual disturbance.   Respiratory: Negative for cough, chest tightness, shortness of breath and wheezing.    Cardiovascular: Negative for chest pain, negative palpitations and negative leg swelling.   Gastrointestinal: Negative for abdominal pain, blood in stool, constipation, diarrhea, nausea and vomiting.   Endocrine: Negative for cold intolerance and heat intolerance.   Genitourinary: Negative for dysuria, flank pain and hematuria.   Musculoskeletal: Negative for arthralgias, back pain, myalgias and neck stiffness.   Skin: Negative for color change and rash.   Allergic/Immunologic: Negative for food allergies.   Neurological: Negative for numbness and headaches.   Hematological: Does not bruise/bleed easily.   Psychiatric/Behavioral: Negative for agitation, behavioral problems and confusion.     Physical Examination:    Vitals:    06/03/25 1234   BP: 124/70   Pulse: 68       Physical Exam  Constitutional:       Appearance: She is well-developed.   HENT:      Head: Normocephalic and atraumatic.   Eyes:      General:         Right eye: No discharge.      Conjunctiva/sclera: Conjunctivae normal.      Pupils: Pupils are equal, round, and

## 2025-08-04 ENCOUNTER — TELEPHONE (OUTPATIENT)
Dept: CARDIOLOGY CLINIC | Age: 80
End: 2025-08-04

## 2025-08-14 ENCOUNTER — OFFICE VISIT (OUTPATIENT)
Age: 80
End: 2025-08-14

## 2025-08-14 VITALS
HEART RATE: 62 BPM | DIASTOLIC BLOOD PRESSURE: 82 MMHG | WEIGHT: 167 LBS | HEIGHT: 65 IN | SYSTOLIC BLOOD PRESSURE: 124 MMHG | BODY MASS INDEX: 27.82 KG/M2

## 2025-08-14 DIAGNOSIS — E78.2 MIXED HYPERLIPIDEMIA: ICD-10-CM

## 2025-08-14 DIAGNOSIS — Z86.79 S/P ABLATION OF ATRIAL FIBRILLATION: ICD-10-CM

## 2025-08-14 DIAGNOSIS — Z86.79 S/P ABLATION OF ATRIAL FLUTTER: ICD-10-CM

## 2025-08-14 DIAGNOSIS — I48.0 HYPERCOAGULABLE STATE DUE TO PAROXYSMAL ATRIAL FIBRILLATION (HCC): ICD-10-CM

## 2025-08-14 DIAGNOSIS — R00.2 PALPITATIONS: ICD-10-CM

## 2025-08-14 DIAGNOSIS — I48.91 ATRIAL FIBRILLATION AND FLUTTER (HCC): Primary | ICD-10-CM

## 2025-08-14 DIAGNOSIS — I49.5 TACHYCARDIA-BRADYCARDIA SYNDROME (HCC): ICD-10-CM

## 2025-08-14 DIAGNOSIS — I48.92 ATRIAL FIBRILLATION AND FLUTTER (HCC): Primary | ICD-10-CM

## 2025-08-14 DIAGNOSIS — D68.69 HYPERCOAGULABLE STATE DUE TO PAROXYSMAL ATRIAL FIBRILLATION (HCC): ICD-10-CM

## 2025-08-14 DIAGNOSIS — R06.02 SOB (SHORTNESS OF BREATH): ICD-10-CM

## 2025-08-14 DIAGNOSIS — Z51.81 ENCOUNTER FOR MONITORING AMIODARONE THERAPY: ICD-10-CM

## 2025-08-14 DIAGNOSIS — Z98.890 S/P ABLATION OF ATRIAL FLUTTER: ICD-10-CM

## 2025-08-14 DIAGNOSIS — I25.10 CORONARY ARTERY DISEASE INVOLVING NATIVE CORONARY ARTERY OF NATIVE HEART WITHOUT ANGINA PECTORIS: ICD-10-CM

## 2025-08-14 DIAGNOSIS — Z98.890 S/P ABLATION OF ATRIAL FIBRILLATION: ICD-10-CM

## 2025-08-14 DIAGNOSIS — Z79.899 ENCOUNTER FOR MONITORING AMIODARONE THERAPY: ICD-10-CM

## 2025-08-14 DIAGNOSIS — I38 VALVULAR HEART DISEASE: ICD-10-CM

## 2025-08-14 DIAGNOSIS — Z95.0 S/P PLACEMENT OF CARDIAC PACEMAKER: ICD-10-CM

## 2025-08-14 RX ORDER — AMIODARONE HYDROCHLORIDE 200 MG/1
100 TABLET ORAL DAILY
Qty: 90 TABLET | Refills: 3
Start: 2025-08-14

## 2025-09-03 RX ORDER — CLOPIDOGREL BISULFATE 75 MG/1
75 TABLET ORAL DAILY
Qty: 90 TABLET | Refills: 3 | Status: SHIPPED | OUTPATIENT
Start: 2025-09-03

## 2025-09-03 RX ORDER — AMIODARONE HYDROCHLORIDE 200 MG/1
200 TABLET ORAL DAILY
Qty: 90 TABLET | Refills: 3 | Status: SHIPPED | OUTPATIENT
Start: 2025-09-03

## (undated) DEVICE — PATCH REF EXT FOR CARTO 3 SYS (EA = 6 PACKS)

## (undated) DEVICE — CATH BLLN ANGIO 3.50X12MM NC EUPHORIA RX

## (undated) DEVICE — INTRODUCER SHTH 11FR CANN L11CM DIL TIP 45MM YEL TUNGSTEN

## (undated) DEVICE — ANGIOGRAPHY KIT CUST MANIFOLD

## (undated) DEVICE — WIRE GUID DIAG PTFE COAT FIX COR STD XIBLE J TIP 7MM

## (undated) DEVICE — Device

## (undated) DEVICE — CATHETER EP 6FR L115CM 2-8-2MM SPC TIP 2MM 10 ELECTRD CSD

## (undated) DEVICE — CATHETER MAP 7FR L115CM 2-6-2 SPC D CRV 22 ELECTRD PENTARAY

## (undated) DEVICE — GLIDESHEATH SLENDER ACCESS KIT: Brand: GLIDESHEATH SLENDER

## (undated) DEVICE — SHEATH INTRO 7FR L12CM GWIRE L150CM DIA0.038IN STD HEMSTAS

## (undated) DEVICE — MINI STICK MICRO ACCESS 4FR

## (undated) DEVICE — 260 CM J TIP WIRE .035

## (undated) DEVICE — RADIFOCUS OPTITORQUE ANGIOGRAPHIC CATHETER: Brand: OPTITORQUE

## (undated) DEVICE — TUBING PMP FOR CARTO SYS SMARTABLATE

## (undated) DEVICE — GUIDE CATHETER: Brand: RUNWAY™

## (undated) DEVICE — BAND COMPR L24CM REG CLR PLAS HEMSTAT EXT HK AND LOOP RETEN

## (undated) DEVICE — CATHETER ABLATION QDOT MICRO DF CURVE BIDIRECTIONAL THERMOCOUPLE

## (undated) DEVICE — CATHETER US GE COMPATIBILITY SOUNDSTAR ECO 10FR

## (undated) DEVICE — INTRODUCER SHTH 8.5FR L60CM DIA18MM 0.038IN TIP L3MM RAMP

## (undated) DEVICE — CATHETER EP 6FR L110CM 2-5-2MM SPC 4 ELECTRD A CRV NYL

## (undated) DEVICE — 1 X VERSACROSS TRANSSEPTAL SHEATH (INCLUDING  1 X J-TIP GUIDEWIRE); 1 X VERSACROSS RF WIRE (INCLUDING 1 X CONNECTOR CABLE (SINGLE USE)); 1 X DISPERSIVE ELECTRODE: Brand: VERSACROSS ACCESS SOLUTION

## (undated) DEVICE — INTRODUCER SHTH 8FR L12CM DIA0.038IN STD HEMSTAS CLOSE TOL

## (undated) DEVICE — PERCUTANEOUS ENTRY THINWALL NEEDLE  ONE-PART: Brand: COOK

## (undated) DEVICE — GUIDEWIRE VASC L182CM DIA0.014IN 3CM RADIOPACITY J TIP MOD